# Patient Record
Sex: FEMALE | URBAN - METROPOLITAN AREA
[De-identification: names, ages, dates, MRNs, and addresses within clinical notes are randomized per-mention and may not be internally consistent; named-entity substitution may affect disease eponyms.]

---

## 2018-04-04 PROBLEM — R10.84 GENERALIZED ABDOMINAL PAIN: Status: ACTIVE | Noted: 2018-04-04

## 2018-04-04 PROBLEM — M41.9 SCOLIOSIS: Status: ACTIVE | Noted: 2018-04-04

## 2018-04-04 PROBLEM — F33.1 MODERATE EPISODE OF RECURRENT MAJOR DEPRESSIVE DISORDER (HCC): Status: ACTIVE | Noted: 2018-04-04

## 2018-04-04 PROBLEM — F41.1 GAD (GENERALIZED ANXIETY DISORDER): Status: ACTIVE | Noted: 2018-04-04

## 2018-04-04 PROBLEM — I10 ESSENTIAL HYPERTENSION: Status: ACTIVE | Noted: 2018-04-04

## 2018-04-04 PROBLEM — M51.36 DDD (DEGENERATIVE DISC DISEASE), LUMBAR: Status: ACTIVE | Noted: 2018-04-04

## 2018-04-04 PROBLEM — F33.41 RECURRENT MAJOR DEPRESSIVE DISORDER, IN PARTIAL REMISSION (HCC): Status: ACTIVE | Noted: 2018-04-04

## 2018-04-06 PROBLEM — J44.9 COPD (CHRONIC OBSTRUCTIVE PULMONARY DISEASE) (HCC): Status: ACTIVE | Noted: 2018-04-06

## 2018-04-06 PROBLEM — M72.0 DUPUYTREN'S CONTRACTURE OF LEFT HAND: Status: ACTIVE | Noted: 2018-04-06

## 2018-04-06 PROBLEM — R19.5 HEME POSITIVE STOOL: Status: ACTIVE | Noted: 2018-04-06

## 2018-06-20 ENCOUNTER — APPOINTMENT (RX ONLY)
Dept: URBAN - METROPOLITAN AREA CLINIC 349 | Facility: CLINIC | Age: 58
Setting detail: DERMATOLOGY
End: 2018-06-20

## 2018-06-20 DIAGNOSIS — D22 MELANOCYTIC NEVI: ICD-10-CM

## 2018-06-20 PROCEDURE — ? COUNSELING

## 2018-06-20 ASSESSMENT — LOCATION DETAILED DESCRIPTION DERM
LOCATION DETAILED: RIGHT INFERIOR MEDIAL UPPER BACK
LOCATION DETAILED: RIGHT PROXIMAL POSTERIOR THIGH
LOCATION DETAILED: RIGHT PROXIMAL DORSAL FOREARM
LOCATION DETAILED: LEFT PROXIMAL DORSAL FOREARM
LOCATION DETAILED: LEFT PROXIMAL POSTERIOR THIGH

## 2018-06-20 ASSESSMENT — LOCATION ZONE DERM
LOCATION ZONE: ARM
LOCATION ZONE: LEG
LOCATION ZONE: TRUNK

## 2018-06-20 ASSESSMENT — LOCATION SIMPLE DESCRIPTION DERM
LOCATION SIMPLE: RIGHT POSTERIOR THIGH
LOCATION SIMPLE: RIGHT FOREARM
LOCATION SIMPLE: LEFT POSTERIOR THIGH
LOCATION SIMPLE: LEFT FOREARM
LOCATION SIMPLE: RIGHT UPPER BACK

## 2018-10-02 ENCOUNTER — HOSPITAL ENCOUNTER (OUTPATIENT)
Dept: MRI IMAGING | Age: 58
Discharge: HOME | End: 2018-10-02
Payer: MEDICARE

## 2018-10-02 DIAGNOSIS — R26.89 BALANCE PROBLEMS: ICD-10-CM

## 2018-10-02 DIAGNOSIS — R42 DIZZY SPELLS: ICD-10-CM

## 2018-10-02 PROCEDURE — 70551 MRI BRAIN STEM W/O DYE: CPT

## 2018-12-02 PROBLEM — R79.89 ELEVATED LIVER FUNCTION TESTS: Status: ACTIVE | Noted: 2018-12-02

## 2018-12-02 PROBLEM — F51.04 PSYCHOPHYSIOLOGICAL INSOMNIA: Status: ACTIVE | Noted: 2018-12-02

## 2018-12-02 PROBLEM — R79.89 ABNORMAL CBC: Status: ACTIVE | Noted: 2018-12-02

## 2018-12-02 PROBLEM — R74.8 ELEVATED CPK: Status: ACTIVE | Noted: 2018-12-02

## 2018-12-02 PROBLEM — R79.89 ELEVATED PLATELET COUNT: Status: ACTIVE | Noted: 2018-12-02

## 2018-12-03 PROBLEM — Z79.899 ENCOUNTER FOR LONG-TERM (CURRENT) USE OF HIGH-RISK MEDICATION: Status: ACTIVE | Noted: 2018-12-03

## 2018-12-03 PROBLEM — Z79.899 LONG TERM PRESCRIPTION BENZODIAZEPINE USE: Status: ACTIVE | Noted: 2018-12-03

## 2022-03-18 PROBLEM — M41.9 SCOLIOSIS: Status: ACTIVE | Noted: 2018-04-04

## 2022-03-19 PROBLEM — F41.1 GAD (GENERALIZED ANXIETY DISORDER): Status: ACTIVE | Noted: 2018-04-04

## 2022-03-19 PROBLEM — R74.8 ELEVATED CPK: Status: ACTIVE | Noted: 2018-12-02

## 2022-03-19 PROBLEM — R79.89 ELEVATED PLATELET COUNT: Status: ACTIVE | Noted: 2018-12-02

## 2022-03-19 PROBLEM — F33.1 MODERATE EPISODE OF RECURRENT MAJOR DEPRESSIVE DISORDER (HCC): Status: ACTIVE | Noted: 2018-04-04

## 2022-03-19 PROBLEM — Z79.899 LONG TERM PRESCRIPTION BENZODIAZEPINE USE: Status: ACTIVE | Noted: 2018-12-03

## 2022-03-19 PROBLEM — Z79.899 ENCOUNTER FOR LONG-TERM (CURRENT) USE OF HIGH-RISK MEDICATION: Status: ACTIVE | Noted: 2018-12-03

## 2022-03-19 PROBLEM — M51.36 DDD (DEGENERATIVE DISC DISEASE), LUMBAR: Status: ACTIVE | Noted: 2018-04-04

## 2022-03-19 PROBLEM — R19.5 HEME POSITIVE STOOL: Status: ACTIVE | Noted: 2018-04-06

## 2022-03-19 PROBLEM — R79.89 ELEVATED LIVER FUNCTION TESTS: Status: ACTIVE | Noted: 2018-12-02

## 2022-03-19 PROBLEM — M51.369 DDD (DEGENERATIVE DISC DISEASE), LUMBAR: Status: ACTIVE | Noted: 2018-04-04

## 2022-03-19 PROBLEM — J44.9 COPD (CHRONIC OBSTRUCTIVE PULMONARY DISEASE) (HCC): Status: ACTIVE | Noted: 2018-04-06

## 2022-03-19 PROBLEM — R79.89 ABNORMAL CBC: Status: ACTIVE | Noted: 2018-12-02

## 2022-03-19 PROBLEM — I10 ESSENTIAL HYPERTENSION: Status: ACTIVE | Noted: 2018-04-04

## 2022-03-19 PROBLEM — M72.0 DUPUYTREN'S CONTRACTURE OF LEFT HAND: Status: ACTIVE | Noted: 2018-04-06

## 2022-03-20 PROBLEM — R10.84 GENERALIZED ABDOMINAL PAIN: Status: ACTIVE | Noted: 2018-04-04

## 2022-03-20 PROBLEM — F51.04 PSYCHOPHYSIOLOGICAL INSOMNIA: Status: ACTIVE | Noted: 2018-12-02

## 2023-03-26 ENCOUNTER — APPOINTMENT (OUTPATIENT)
Dept: GENERAL RADIOLOGY | Age: 63
DRG: 558 | End: 2023-03-26
Payer: MEDICARE

## 2023-03-26 ENCOUNTER — HOSPITAL ENCOUNTER (INPATIENT)
Age: 63
LOS: 2 days | Discharge: HOME | DRG: 558 | End: 2023-03-29
Attending: EMERGENCY MEDICINE | Admitting: FAMILY MEDICINE
Payer: MEDICARE

## 2023-03-26 ENCOUNTER — APPOINTMENT (OUTPATIENT)
Dept: CT IMAGING | Age: 63
DRG: 558 | End: 2023-03-26
Payer: MEDICARE

## 2023-03-26 DIAGNOSIS — R11.2 NAUSEA VOMITING AND DIARRHEA: ICD-10-CM

## 2023-03-26 DIAGNOSIS — R79.89 ELEVATED PROCALCITONIN: ICD-10-CM

## 2023-03-26 DIAGNOSIS — K52.9 COLITIS: ICD-10-CM

## 2023-03-26 DIAGNOSIS — E87.6 HYPOKALEMIA: Primary | ICD-10-CM

## 2023-03-26 DIAGNOSIS — R53.1 GENERALIZED WEAKNESS: ICD-10-CM

## 2023-03-26 DIAGNOSIS — R19.7 NAUSEA VOMITING AND DIARRHEA: ICD-10-CM

## 2023-03-26 DIAGNOSIS — E86.0 DEHYDRATION: ICD-10-CM

## 2023-03-26 DIAGNOSIS — R74.8 ELEVATED CPK: ICD-10-CM

## 2023-03-26 PROCEDURE — 87635 SARS-COV-2 COVID-19 AMP PRB: CPT

## 2023-03-26 PROCEDURE — 93005 ELECTROCARDIOGRAM TRACING: CPT | Performed by: EMERGENCY MEDICINE

## 2023-03-26 PROCEDURE — 96375 TX/PRO/DX INJ NEW DRUG ADDON: CPT

## 2023-03-26 PROCEDURE — 82077 ASSAY SPEC XCP UR&BREATH IA: CPT

## 2023-03-26 PROCEDURE — 80053 COMPREHEN METABOLIC PANEL: CPT

## 2023-03-26 PROCEDURE — 96361 HYDRATE IV INFUSION ADD-ON: CPT

## 2023-03-26 PROCEDURE — 84145 PROCALCITONIN (PCT): CPT

## 2023-03-26 PROCEDURE — 99285 EMERGENCY DEPT VISIT HI MDM: CPT

## 2023-03-26 PROCEDURE — 85025 COMPLETE CBC W/AUTO DIFF WBC: CPT

## 2023-03-26 PROCEDURE — 82550 ASSAY OF CK (CPK): CPT

## 2023-03-26 PROCEDURE — 87040 BLOOD CULTURE FOR BACTERIA: CPT

## 2023-03-26 PROCEDURE — 84484 ASSAY OF TROPONIN QUANT: CPT

## 2023-03-26 PROCEDURE — 71045 X-RAY EXAM CHEST 1 VIEW: CPT

## 2023-03-26 PROCEDURE — 83605 ASSAY OF LACTIC ACID: CPT

## 2023-03-26 PROCEDURE — 96374 THER/PROPH/DIAG INJ IV PUSH: CPT

## 2023-03-26 RX ORDER — ONDANSETRON 2 MG/ML
4 INJECTION INTRAMUSCULAR; INTRAVENOUS ONCE
Status: COMPLETED | OUTPATIENT
Start: 2023-03-26 | End: 2023-03-27

## 2023-03-26 RX ORDER — SODIUM CHLORIDE 9 MG/ML
30 INJECTION, SOLUTION INTRAVENOUS ONCE
Status: COMPLETED | OUTPATIENT
Start: 2023-03-26 | End: 2023-03-27

## 2023-03-26 ASSESSMENT — ENCOUNTER SYMPTOMS
COUGH: 0
SHORTNESS OF BREATH: 0
DIARRHEA: 1
NAUSEA: 1
BACK PAIN: 1
VOMITING: 1

## 2023-03-27 ENCOUNTER — APPOINTMENT (OUTPATIENT)
Dept: CT IMAGING | Age: 63
DRG: 558 | End: 2023-03-27
Payer: MEDICARE

## 2023-03-27 PROBLEM — J44.9 COPD (CHRONIC OBSTRUCTIVE PULMONARY DISEASE) (HCC): Status: ACTIVE | Noted: 2018-04-06

## 2023-03-27 PROBLEM — K52.9 COLITIS: Status: ACTIVE | Noted: 2023-03-27

## 2023-03-27 PROBLEM — M62.82 RHABDOMYOLYSIS: Status: ACTIVE | Noted: 2023-03-27

## 2023-03-27 PROBLEM — I10 ESSENTIAL HYPERTENSION: Status: ACTIVE | Noted: 2018-04-04

## 2023-03-27 LAB
ALBUMIN SERPL-MCNC: 3.6 G/DL (ref 3.2–4.6)
ALBUMIN/GLOB SERPL: 1.1 (ref 0.4–1.6)
ALP SERPL-CCNC: 85 U/L (ref 50–136)
ALT SERPL-CCNC: 17 U/L (ref 12–65)
AMPHET UR QL SCN: NEGATIVE
ANION GAP SERPL CALC-SCNC: 2 MMOL/L (ref 2–11)
ANION GAP SERPL CALC-SCNC: 7 MMOL/L (ref 2–11)
APPEARANCE UR: ABNORMAL
AST SERPL-CCNC: 50 U/L (ref 15–37)
BACTERIA URNS QL MICRO: ABNORMAL /HPF
BARBITURATES UR QL SCN: NEGATIVE
BASOPHILS # BLD: 0 K/UL (ref 0–0.2)
BASOPHILS # BLD: 0 K/UL (ref 0–0.2)
BASOPHILS NFR BLD: 0 % (ref 0–2)
BASOPHILS NFR BLD: 0 % (ref 0–2)
BENZODIAZ UR QL: POSITIVE
BILIRUB SERPL-MCNC: 0.6 MG/DL (ref 0.2–1.1)
BILIRUB UR QL: ABNORMAL
BUN SERPL-MCNC: 17 MG/DL (ref 8–23)
BUN SERPL-MCNC: 19 MG/DL (ref 8–23)
CALCIUM SERPL-MCNC: 8.2 MG/DL (ref 8.3–10.4)
CALCIUM SERPL-MCNC: 9.2 MG/DL (ref 8.3–10.4)
CANNABINOIDS UR QL SCN: POSITIVE
CASTS URNS QL MICRO: ABNORMAL /LPF
CHLORIDE SERPL-SCNC: 104 MMOL/L (ref 101–110)
CHLORIDE SERPL-SCNC: 98 MMOL/L (ref 101–110)
CK SERPL-CCNC: 1351 U/L (ref 21–215)
CK SERPL-CCNC: 2938 U/L (ref 21–215)
CO2 SERPL-SCNC: 26 MMOL/L (ref 21–32)
CO2 SERPL-SCNC: 27 MMOL/L (ref 21–32)
COCAINE UR QL SCN: NEGATIVE
COLOR UR: ABNORMAL
CREAT SERPL-MCNC: 0.7 MG/DL (ref 0.6–1)
CREAT SERPL-MCNC: 0.8 MG/DL (ref 0.6–1)
DIFFERENTIAL METHOD BLD: ABNORMAL
DIFFERENTIAL METHOD BLD: ABNORMAL
EKG ATRIAL RATE: 99 BPM
EKG DIAGNOSIS: NORMAL
EKG P AXIS: 73 DEGREES
EKG P-R INTERVAL: 47 MS
EKG Q-T INTERVAL: 367 MS
EKG QRS DURATION: 101 MS
EKG QTC CALCULATION (BAZETT): 471 MS
EKG R AXIS: 76 DEGREES
EKG T AXIS: -83 DEGREES
EKG VENTRICULAR RATE: 99 BPM
EOSINOPHIL # BLD: 0 K/UL (ref 0–0.8)
EOSINOPHIL # BLD: 0 K/UL (ref 0–0.8)
EOSINOPHIL NFR BLD: 0 % (ref 0.5–7.8)
EOSINOPHIL NFR BLD: 0 % (ref 0.5–7.8)
EPI CELLS #/AREA URNS HPF: ABNORMAL /HPF
ERYTHROCYTE [DISTWIDTH] IN BLOOD BY AUTOMATED COUNT: 13.6 % (ref 11.9–14.6)
ERYTHROCYTE [DISTWIDTH] IN BLOOD BY AUTOMATED COUNT: 13.6 % (ref 11.9–14.6)
ETHANOL SERPL-MCNC: <3 MG/DL (ref 0–0.08)
GLOBULIN SER CALC-MCNC: 3.4 G/DL (ref 2.8–4.5)
GLUCOSE BLD STRIP.AUTO-MCNC: 104 MG/DL (ref 65–100)
GLUCOSE SERPL-MCNC: 127 MG/DL (ref 65–100)
GLUCOSE SERPL-MCNC: 138 MG/DL (ref 65–100)
GLUCOSE UR STRIP.AUTO-MCNC: NEGATIVE MG/DL
HCT VFR BLD AUTO: 39.6 % (ref 35.8–46.3)
HCT VFR BLD AUTO: 44.2 % (ref 35.8–46.3)
HGB BLD-MCNC: 13.5 G/DL (ref 11.7–15.4)
HGB BLD-MCNC: 15.2 G/DL (ref 11.7–15.4)
HGB UR QL STRIP: ABNORMAL
IMM GRANULOCYTES # BLD AUTO: 0 K/UL (ref 0–0.5)
IMM GRANULOCYTES # BLD AUTO: 0.1 K/UL (ref 0–0.5)
IMM GRANULOCYTES NFR BLD AUTO: 0 % (ref 0–5)
IMM GRANULOCYTES NFR BLD AUTO: 1 % (ref 0–5)
KETONES UR QL STRIP.AUTO: ABNORMAL MG/DL
LACTATE SERPL-SCNC: 1.5 MMOL/L (ref 0.4–2)
LACTATE SERPL-SCNC: 1.9 MMOL/L (ref 0.4–2)
LEUKOCYTE ESTERASE UR QL STRIP.AUTO: ABNORMAL
LYMPHOCYTES # BLD: 0.7 K/UL (ref 0.5–4.6)
LYMPHOCYTES # BLD: 0.8 K/UL (ref 0.5–4.6)
LYMPHOCYTES NFR BLD: 10 % (ref 13–44)
LYMPHOCYTES NFR BLD: 7 % (ref 13–44)
MAGNESIUM SERPL-MCNC: 1 MG/DL (ref 1.8–2.4)
MCH RBC QN AUTO: 33.9 PG (ref 26.1–32.9)
MCH RBC QN AUTO: 34 PG (ref 26.1–32.9)
MCHC RBC AUTO-ENTMCNC: 34.1 G/DL (ref 31.4–35)
MCHC RBC AUTO-ENTMCNC: 34.4 G/DL (ref 31.4–35)
MCV RBC AUTO: 98.4 FL (ref 82–102)
MCV RBC AUTO: 99.7 FL (ref 82–102)
METHADONE UR QL: NEGATIVE
MONOCYTES # BLD: 0.5 K/UL (ref 0.1–1.3)
MONOCYTES # BLD: 0.8 K/UL (ref 0.1–1.3)
MONOCYTES NFR BLD: 7 % (ref 4–12)
MONOCYTES NFR BLD: 7 % (ref 4–12)
NEUTS SEG # BLD: 6.3 K/UL (ref 1.7–8.2)
NEUTS SEG # BLD: 9.4 K/UL (ref 1.7–8.2)
NEUTS SEG NFR BLD: 83 % (ref 43–78)
NEUTS SEG NFR BLD: 85 % (ref 43–78)
NITRITE UR QL STRIP.AUTO: NEGATIVE
NRBC # BLD: 0 K/UL (ref 0–0.2)
NRBC # BLD: 0 K/UL (ref 0–0.2)
OPIATES UR QL: NEGATIVE
OTHER OBSERVATIONS: ABNORMAL
PCP UR QL: NEGATIVE
PH UR STRIP: 6 (ref 5–9)
PLATELET # BLD AUTO: 180 K/UL (ref 150–450)
PLATELET # BLD AUTO: 221 K/UL (ref 150–450)
PMV BLD AUTO: 10 FL (ref 9.4–12.3)
PMV BLD AUTO: 9.7 FL (ref 9.4–12.3)
POTASSIUM SERPL-SCNC: 3.1 MMOL/L (ref 3.5–5.1)
POTASSIUM SERPL-SCNC: 3.4 MMOL/L (ref 3.5–5.1)
PROCALCITONIN SERPL-MCNC: 2.97 NG/ML (ref 0–0.49)
PROT SERPL-MCNC: 7 G/DL (ref 6.3–8.2)
PROT UR STRIP-MCNC: 100 MG/DL
RBC # BLD AUTO: 3.97 M/UL (ref 4.05–5.2)
RBC # BLD AUTO: 4.49 M/UL (ref 4.05–5.2)
RBC #/AREA URNS HPF: ABNORMAL /HPF
SARS-COV-2 RDRP RESP QL NAA+PROBE: NOT DETECTED
SERVICE CMNT-IMP: ABNORMAL
SODIUM SERPL-SCNC: 132 MMOL/L (ref 133–143)
SODIUM SERPL-SCNC: 132 MMOL/L (ref 133–143)
SOURCE: NORMAL
SP GR UR REFRACTOMETRY: 1.02 (ref 1–1.02)
TROPONIN I SERPL HS-MCNC: 9.5 PG/ML (ref 0–37)
TSH W FREE THYROID IF ABNORMAL: 0.97 UIU/ML (ref 0.36–3.74)
UROBILINOGEN UR QL STRIP.AUTO: 1 EU/DL (ref 0.2–1)
WBC # BLD AUTO: 11 K/UL (ref 4.3–11.1)
WBC # BLD AUTO: 7.6 K/UL (ref 4.3–11.1)
WBC URNS QL MICRO: ABNORMAL /HPF

## 2023-03-27 PROCEDURE — 84443 ASSAY THYROID STIM HORMONE: CPT

## 2023-03-27 PROCEDURE — 36415 COLL VENOUS BLD VENIPUNCTURE: CPT

## 2023-03-27 PROCEDURE — 70498 CT ANGIOGRAPHY NECK: CPT

## 2023-03-27 PROCEDURE — 6370000000 HC RX 637 (ALT 250 FOR IP): Performed by: FAMILY MEDICINE

## 2023-03-27 PROCEDURE — 82962 GLUCOSE BLOOD TEST: CPT

## 2023-03-27 PROCEDURE — 6360000002 HC RX W HCPCS: Performed by: EMERGENCY MEDICINE

## 2023-03-27 PROCEDURE — 83605 ASSAY OF LACTIC ACID: CPT

## 2023-03-27 PROCEDURE — 97530 THERAPEUTIC ACTIVITIES: CPT

## 2023-03-27 PROCEDURE — 2500000003 HC RX 250 WO HCPCS: Performed by: INTERNAL MEDICINE

## 2023-03-27 PROCEDURE — 74176 CT ABD & PELVIS W/O CONTRAST: CPT

## 2023-03-27 PROCEDURE — 6370000000 HC RX 637 (ALT 250 FOR IP): Performed by: INTERNAL MEDICINE

## 2023-03-27 PROCEDURE — 80307 DRUG TEST PRSMV CHEM ANLYZR: CPT

## 2023-03-27 PROCEDURE — 70450 CT HEAD/BRAIN W/O DYE: CPT

## 2023-03-27 PROCEDURE — 80048 BASIC METABOLIC PNL TOTAL CA: CPT

## 2023-03-27 PROCEDURE — 97161 PT EVAL LOW COMPLEX 20 MIN: CPT

## 2023-03-27 PROCEDURE — 82550 ASSAY OF CK (CPK): CPT

## 2023-03-27 PROCEDURE — 6360000002 HC RX W HCPCS: Performed by: FAMILY MEDICINE

## 2023-03-27 PROCEDURE — 83735 ASSAY OF MAGNESIUM: CPT

## 2023-03-27 PROCEDURE — 1100000000 HC RM PRIVATE

## 2023-03-27 PROCEDURE — 81001 URINALYSIS AUTO W/SCOPE: CPT

## 2023-03-27 PROCEDURE — 6360000002 HC RX W HCPCS: Performed by: INTERNAL MEDICINE

## 2023-03-27 PROCEDURE — 81003 URINALYSIS AUTO W/O SCOPE: CPT

## 2023-03-27 PROCEDURE — 85025 COMPLETE CBC W/AUTO DIFF WBC: CPT

## 2023-03-27 PROCEDURE — A4216 STERILE WATER/SALINE, 10 ML: HCPCS | Performed by: EMERGENCY MEDICINE

## 2023-03-27 PROCEDURE — 2580000003 HC RX 258: Performed by: FAMILY MEDICINE

## 2023-03-27 PROCEDURE — 2500000003 HC RX 250 WO HCPCS: Performed by: EMERGENCY MEDICINE

## 2023-03-27 PROCEDURE — 2580000003 HC RX 258: Performed by: EMERGENCY MEDICINE

## 2023-03-27 PROCEDURE — 6360000004 HC RX CONTRAST MEDICATION: Performed by: FAMILY MEDICINE

## 2023-03-27 RX ORDER — LORAZEPAM 1 MG/1
2 TABLET ORAL
Status: DISCONTINUED | OUTPATIENT
Start: 2023-03-27 | End: 2023-03-29 | Stop reason: HOSPADM

## 2023-03-27 RX ORDER — SERTRALINE HYDROCHLORIDE 100 MG/1
200 TABLET, FILM COATED ORAL DAILY
Status: DISCONTINUED | OUTPATIENT
Start: 2023-03-27 | End: 2023-03-29 | Stop reason: HOSPADM

## 2023-03-27 RX ORDER — SODIUM CHLORIDE 0.9 % (FLUSH) 0.9 %
5-40 SYRINGE (ML) INJECTION PRN
Status: DISCONTINUED | OUTPATIENT
Start: 2023-03-27 | End: 2023-03-29 | Stop reason: HOSPADM

## 2023-03-27 RX ORDER — SODIUM CHLORIDE 9 MG/ML
INJECTION, SOLUTION INTRAVENOUS PRN
Status: DISCONTINUED | OUTPATIENT
Start: 2023-03-27 | End: 2023-03-29 | Stop reason: HOSPADM

## 2023-03-27 RX ORDER — SODIUM CHLORIDE 0.9 % (FLUSH) 0.9 %
5-40 SYRINGE (ML) INJECTION EVERY 12 HOURS SCHEDULED
Status: DISCONTINUED | OUTPATIENT
Start: 2023-03-27 | End: 2023-03-29 | Stop reason: HOSPADM

## 2023-03-27 RX ORDER — TRAZODONE HYDROCHLORIDE 50 MG/1
150 TABLET ORAL NIGHTLY PRN
Status: DISCONTINUED | OUTPATIENT
Start: 2023-03-27 | End: 2023-03-29 | Stop reason: HOSPADM

## 2023-03-27 RX ORDER — POLYETHYLENE GLYCOL 3350 17 G/17G
17 POWDER, FOR SOLUTION ORAL DAILY PRN
Status: DISCONTINUED | OUTPATIENT
Start: 2023-03-27 | End: 2023-03-29 | Stop reason: HOSPADM

## 2023-03-27 RX ORDER — PANTOPRAZOLE SODIUM 40 MG/1
40 TABLET, DELAYED RELEASE ORAL
Status: DISCONTINUED | OUTPATIENT
Start: 2023-03-27 | End: 2023-03-29 | Stop reason: HOSPADM

## 2023-03-27 RX ORDER — ZOLPIDEM TARTRATE 5 MG/1
5 TABLET ORAL NIGHTLY PRN
Status: DISCONTINUED | OUTPATIENT
Start: 2023-03-27 | End: 2023-03-29 | Stop reason: HOSPADM

## 2023-03-27 RX ORDER — SODIUM CHLORIDE 0.9 % (FLUSH) 0.9 %
5-40 SYRINGE (ML) INJECTION EVERY 12 HOURS SCHEDULED
Status: DISCONTINUED | OUTPATIENT
Start: 2023-03-28 | End: 2023-03-29 | Stop reason: HOSPADM

## 2023-03-27 RX ORDER — LANOLIN ALCOHOL/MO/W.PET/CERES
100 CREAM (GRAM) TOPICAL DAILY
Status: DISCONTINUED | OUTPATIENT
Start: 2023-03-28 | End: 2023-03-29 | Stop reason: HOSPADM

## 2023-03-27 RX ORDER — GABAPENTIN 400 MG/1
400 CAPSULE ORAL 2 TIMES DAILY
Status: DISCONTINUED | OUTPATIENT
Start: 2023-03-27 | End: 2023-03-29 | Stop reason: HOSPADM

## 2023-03-27 RX ORDER — CLONAZEPAM 1 MG/1
1 TABLET ORAL 3 TIMES DAILY
Status: DISCONTINUED | OUTPATIENT
Start: 2023-03-27 | End: 2023-03-29 | Stop reason: HOSPADM

## 2023-03-27 RX ORDER — LORAZEPAM 1 MG/1
3 TABLET ORAL
Status: DISCONTINUED | OUTPATIENT
Start: 2023-03-27 | End: 2023-03-29 | Stop reason: HOSPADM

## 2023-03-27 RX ORDER — POTASSIUM CHLORIDE 7.45 MG/ML
10 INJECTION INTRAVENOUS
Status: ACTIVE | OUTPATIENT
Start: 2023-03-27 | End: 2023-03-27

## 2023-03-27 RX ORDER — POTASSIUM CHLORIDE 20 MEQ/1
40 TABLET, EXTENDED RELEASE ORAL PRN
Status: DISCONTINUED | OUTPATIENT
Start: 2023-03-27 | End: 2023-03-29 | Stop reason: HOSPADM

## 2023-03-27 RX ORDER — OXYMETAZOLINE HYDROCHLORIDE 0.05 G/100ML
2 SPRAY NASAL 2 TIMES DAILY
Status: DISCONTINUED | OUTPATIENT
Start: 2023-03-27 | End: 2023-03-29 | Stop reason: HOSPADM

## 2023-03-27 RX ORDER — ACETAMINOPHEN 325 MG/1
650 TABLET ORAL EVERY 6 HOURS PRN
Status: DISCONTINUED | OUTPATIENT
Start: 2023-03-27 | End: 2023-03-29 | Stop reason: HOSPADM

## 2023-03-27 RX ORDER — SODIUM CHLORIDE, SODIUM LACTATE, POTASSIUM CHLORIDE, CALCIUM CHLORIDE 600; 310; 30; 20 MG/100ML; MG/100ML; MG/100ML; MG/100ML
INJECTION, SOLUTION INTRAVENOUS CONTINUOUS
Status: DISCONTINUED | OUTPATIENT
Start: 2023-03-27 | End: 2023-03-29 | Stop reason: HOSPADM

## 2023-03-27 RX ORDER — ONDANSETRON 2 MG/ML
4 INJECTION INTRAMUSCULAR; INTRAVENOUS EVERY 6 HOURS PRN
Status: DISCONTINUED | OUTPATIENT
Start: 2023-03-27 | End: 2023-03-29 | Stop reason: HOSPADM

## 2023-03-27 RX ORDER — SUMATRIPTAN 50 MG/1
50 TABLET, FILM COATED ORAL ONCE
Status: COMPLETED | OUTPATIENT
Start: 2023-03-27 | End: 2023-03-27

## 2023-03-27 RX ORDER — LORAZEPAM 1 MG/1
1 TABLET ORAL
Status: DISCONTINUED | OUTPATIENT
Start: 2023-03-27 | End: 2023-03-29 | Stop reason: HOSPADM

## 2023-03-27 RX ORDER — METOPROLOL TARTRATE 50 MG/1
50 TABLET, FILM COATED ORAL 2 TIMES DAILY
Status: DISCONTINUED | OUTPATIENT
Start: 2023-03-27 | End: 2023-03-29 | Stop reason: HOSPADM

## 2023-03-27 RX ORDER — METRONIDAZOLE 500 MG/100ML
500 INJECTION, SOLUTION INTRAVENOUS ONCE
Status: COMPLETED | OUTPATIENT
Start: 2023-03-27 | End: 2023-03-27

## 2023-03-27 RX ORDER — 0.9 % SODIUM CHLORIDE 0.9 %
100 INTRAVENOUS SOLUTION INTRAVENOUS ONCE
Status: COMPLETED | OUTPATIENT
Start: 2023-03-27 | End: 2023-03-28

## 2023-03-27 RX ORDER — POTASSIUM CHLORIDE 7.45 MG/ML
10 INJECTION INTRAVENOUS PRN
Status: DISCONTINUED | OUTPATIENT
Start: 2023-03-27 | End: 2023-03-29 | Stop reason: HOSPADM

## 2023-03-27 RX ORDER — LORAZEPAM 2 MG/ML
1 INJECTION INTRAMUSCULAR ONCE
Status: COMPLETED | OUTPATIENT
Start: 2023-03-27 | End: 2023-03-27

## 2023-03-27 RX ORDER — LORAZEPAM 2 MG/ML
1 INJECTION INTRAMUSCULAR
Status: DISCONTINUED | OUTPATIENT
Start: 2023-03-27 | End: 2023-03-29 | Stop reason: HOSPADM

## 2023-03-27 RX ORDER — NICOTINE 21 MG/24HR
1 PATCH, TRANSDERMAL 24 HOURS TRANSDERMAL DAILY
Status: DISCONTINUED | OUTPATIENT
Start: 2023-03-27 | End: 2023-03-29 | Stop reason: HOSPADM

## 2023-03-27 RX ORDER — LANOLIN ALCOHOL/MO/W.PET/CERES
500 CREAM (GRAM) TOPICAL DAILY
Status: DISCONTINUED | OUTPATIENT
Start: 2023-03-27 | End: 2023-03-29 | Stop reason: HOSPADM

## 2023-03-27 RX ORDER — ONDANSETRON 4 MG/1
4 TABLET, ORALLY DISINTEGRATING ORAL EVERY 8 HOURS PRN
Status: DISCONTINUED | OUTPATIENT
Start: 2023-03-27 | End: 2023-03-29 | Stop reason: HOSPADM

## 2023-03-27 RX ORDER — MAGNESIUM SULFATE IN WATER 40 MG/ML
2000 INJECTION, SOLUTION INTRAVENOUS PRN
Status: DISCONTINUED | OUTPATIENT
Start: 2023-03-27 | End: 2023-03-29 | Stop reason: HOSPADM

## 2023-03-27 RX ORDER — ENOXAPARIN SODIUM 100 MG/ML
40 INJECTION SUBCUTANEOUS
Status: DISCONTINUED | OUTPATIENT
Start: 2023-03-27 | End: 2023-03-29 | Stop reason: HOSPADM

## 2023-03-27 RX ORDER — METRONIDAZOLE 500 MG/1
500 TABLET ORAL EVERY 8 HOURS SCHEDULED
Status: DISCONTINUED | OUTPATIENT
Start: 2023-03-27 | End: 2023-03-29 | Stop reason: HOSPADM

## 2023-03-27 RX ORDER — LORAZEPAM 2 MG/ML
2 INJECTION INTRAMUSCULAR
Status: DISCONTINUED | OUTPATIENT
Start: 2023-03-27 | End: 2023-03-29 | Stop reason: HOSPADM

## 2023-03-27 RX ORDER — TRAMADOL HYDROCHLORIDE 50 MG/1
50 TABLET ORAL EVERY 6 HOURS PRN
Status: DISCONTINUED | OUTPATIENT
Start: 2023-03-27 | End: 2023-03-29 | Stop reason: HOSPADM

## 2023-03-27 RX ORDER — LORAZEPAM 2 MG/ML
3 INJECTION INTRAMUSCULAR
Status: DISCONTINUED | OUTPATIENT
Start: 2023-03-27 | End: 2023-03-29 | Stop reason: HOSPADM

## 2023-03-27 RX ORDER — LORAZEPAM 1 MG/1
4 TABLET ORAL
Status: DISCONTINUED | OUTPATIENT
Start: 2023-03-27 | End: 2023-03-29 | Stop reason: HOSPADM

## 2023-03-27 RX ORDER — LANOLIN ALCOHOL/MO/W.PET/CERES
3 CREAM (GRAM) TOPICAL ONCE
Status: COMPLETED | OUTPATIENT
Start: 2023-03-27 | End: 2023-03-27

## 2023-03-27 RX ORDER — CIPROFLOXACIN 2 MG/ML
400 INJECTION, SOLUTION INTRAVENOUS EVERY 12 HOURS
Status: DISCONTINUED | OUTPATIENT
Start: 2023-03-27 | End: 2023-03-29 | Stop reason: HOSPADM

## 2023-03-27 RX ORDER — LORAZEPAM 2 MG/ML
4 INJECTION INTRAMUSCULAR
Status: DISCONTINUED | OUTPATIENT
Start: 2023-03-27 | End: 2023-03-29 | Stop reason: HOSPADM

## 2023-03-27 RX ADMIN — CIPROFLOXACIN 400 MG: 2 INJECTION, SOLUTION INTRAVENOUS at 18:17

## 2023-03-27 RX ADMIN — MAGNESIUM SULFATE HEPTAHYDRATE 2000 MG: 40 INJECTION, SOLUTION INTRAVENOUS at 10:50

## 2023-03-27 RX ADMIN — ONDANSETRON 4 MG: 2 INJECTION INTRAMUSCULAR; INTRAVENOUS at 09:30

## 2023-03-27 RX ADMIN — METOPROLOL TARTRATE 50 MG: 50 TABLET ORAL at 08:31

## 2023-03-27 RX ADMIN — SODIUM CHLORIDE, PRESERVATIVE FREE 10 ML: 5 INJECTION INTRAVENOUS at 20:51

## 2023-03-27 RX ADMIN — POTASSIUM CHLORIDE 10 MEQ: 7.46 INJECTION, SOLUTION INTRAVENOUS at 01:27

## 2023-03-27 RX ADMIN — METRONIDAZOLE 500 MG: 500 TABLET ORAL at 14:36

## 2023-03-27 RX ADMIN — MAGNESIUM SULFATE HEPTAHYDRATE 2000 MG: 40 INJECTION, SOLUTION INTRAVENOUS at 08:33

## 2023-03-27 RX ADMIN — OXYMETAZOLINE HCL 2 SPRAY: 0.05 SPRAY NASAL at 14:36

## 2023-03-27 RX ADMIN — SUMATRIPTAN SUCCINATE 50 MG: 50 TABLET ORAL at 23:48

## 2023-03-27 RX ADMIN — SODIUM CHLORIDE, PRESERVATIVE FREE 10 ML: 5 INJECTION INTRAVENOUS at 23:15

## 2023-03-27 RX ADMIN — CYANOCOBALAMIN TAB 1000 MCG 500 MCG: 1000 TAB at 08:32

## 2023-03-27 RX ADMIN — IOPAMIDOL 100 ML: 755 INJECTION, SOLUTION INTRAVENOUS at 23:13

## 2023-03-27 RX ADMIN — TUBERCULIN PURIFIED PROTEIN DERIVATIVE 5 UNITS: 5 INJECTION, SOLUTION INTRADERMAL at 08:41

## 2023-03-27 RX ADMIN — CIPROFLOXACIN 400 MG: 2 INJECTION, SOLUTION INTRAVENOUS at 06:24

## 2023-03-27 RX ADMIN — PANTOPRAZOLE SODIUM 40 MG: 40 TABLET, DELAYED RELEASE ORAL at 06:23

## 2023-03-27 RX ADMIN — LORAZEPAM 1 MG: 2 INJECTION INTRAMUSCULAR; INTRAVENOUS at 22:48

## 2023-03-27 RX ADMIN — METOPROLOL TARTRATE 50 MG: 50 TABLET ORAL at 20:56

## 2023-03-27 RX ADMIN — POTASSIUM BICARBONATE 40 MEQ: 782 TABLET, EFFERVESCENT ORAL at 06:23

## 2023-03-27 RX ADMIN — METRONIDAZOLE 500 MG: 500 TABLET ORAL at 06:23

## 2023-03-27 RX ADMIN — FAMOTIDINE 20 MG: 10 INJECTION INTRAVENOUS at 00:22

## 2023-03-27 RX ADMIN — TRAMADOL HYDROCHLORIDE 50 MG: 50 TABLET ORAL at 03:01

## 2023-03-27 RX ADMIN — Medication 3 MG: at 20:56

## 2023-03-27 RX ADMIN — CHOLECALCIFEROL TAB 125 MCG (5000 UNIT) 5000 UNITS: 125 TAB at 08:31

## 2023-03-27 RX ADMIN — ONDANSETRON 4 MG: 2 INJECTION INTRAMUSCULAR; INTRAVENOUS at 00:21

## 2023-03-27 RX ADMIN — SODIUM CHLORIDE, PRESERVATIVE FREE 10 ML: 5 INJECTION INTRAVENOUS at 08:32

## 2023-03-27 RX ADMIN — ENOXAPARIN SODIUM 40 MG: 40 INJECTION SUBCUTANEOUS at 23:48

## 2023-03-27 RX ADMIN — METRONIDAZOLE 500 MG: 500 INJECTION, SOLUTION INTRAVENOUS at 01:29

## 2023-03-27 RX ADMIN — METRONIDAZOLE 500 MG: 500 TABLET ORAL at 23:48

## 2023-03-27 RX ADMIN — SODIUM CHLORIDE 100 ML: 9 INJECTION, SOLUTION INTRAVENOUS at 23:15

## 2023-03-27 RX ADMIN — CEFTRIAXONE 1000 MG: 1 INJECTION, POWDER, FOR SOLUTION INTRAMUSCULAR; INTRAVENOUS at 01:12

## 2023-03-27 RX ADMIN — SERTRALINE 200 MG: 100 TABLET, FILM COATED ORAL at 08:31

## 2023-03-27 RX ADMIN — SODIUM CHLORIDE 30 ML/KG/HR: 9 INJECTION, SOLUTION INTRAVENOUS at 00:22

## 2023-03-27 RX ADMIN — ACETAMINOPHEN 650 MG: 325 TABLET ORAL at 14:36

## 2023-03-27 RX ADMIN — SODIUM CHLORIDE, POTASSIUM CHLORIDE, SODIUM LACTATE AND CALCIUM CHLORIDE: 600; 310; 30; 20 INJECTION, SOLUTION INTRAVENOUS at 03:00

## 2023-03-27 RX ADMIN — OXYMETAZOLINE HCL 2 SPRAY: 0.05 SPRAY NASAL at 20:55

## 2023-03-27 ASSESSMENT — PAIN - FUNCTIONAL ASSESSMENT: PAIN_FUNCTIONAL_ASSESSMENT: PREVENTS OR INTERFERES SOME ACTIVE ACTIVITIES AND ADLS

## 2023-03-27 ASSESSMENT — PAIN SCALES - GENERAL
PAINLEVEL_OUTOF10: 0
PAINLEVEL_OUTOF10: 6
PAINLEVEL_OUTOF10: 10
PAINLEVEL_OUTOF10: 0
PAINLEVEL_OUTOF10: 10

## 2023-03-27 ASSESSMENT — PAIN DESCRIPTION - ORIENTATION
ORIENTATION: MID;LOWER
ORIENTATION: MID;LOWER

## 2023-03-27 ASSESSMENT — PAIN DESCRIPTION - DESCRIPTORS
DESCRIPTORS: ACHING
DESCRIPTORS: ACHING;DISCOMFORT

## 2023-03-27 ASSESSMENT — PAIN DESCRIPTION - LOCATION
LOCATION: HEAD;NECK
LOCATION: ABDOMEN;NECK;BACK
LOCATION: BACK;NECK;LEG

## 2023-03-27 NOTE — PROGRESS NOTES
Potassium is 3.4. Pt not tolerating last bag of IV Potassium. Will give PO Potassium with morning meds.

## 2023-03-27 NOTE — ED NOTES
TRANSFER - OUT REPORT:    Verbal report given to Yen Jean RN on Natty Juarez  being transferred to  for routine progression of patient care       Report consisted of patient's Situation, Background, Assessment and   Recommendations(SBAR). Information from the following report(s) Nurse Handoff Report, Intake/Output, MAR, and Recent Results was reviewed with the receiving nurse. [place] Assessment: No data recorded  Lines:   Peripheral IV 03/27/23 Right Antecubital (Active)        Opportunity for questions and clarification was provided.       Patient transported with:  Qi Claire RN  03/27/23 5102

## 2023-03-27 NOTE — ASSESSMENT & PLAN NOTE
- CK is 1,357  - Renal function okay at this time, continue to monitor  - IVFs  - Monitor daily CK levels

## 2023-03-27 NOTE — PROGRESS NOTES
Exercise/HEP  Gait Training  Modalities  Education       TREATMENT:   EVALUATION: LOW COMPLEXITY: (Untimed Charge)    TREATMENT:   Therapeutic Activity (23 Minutes): Therapeutic activity included Scooting, Transfer Training, Ambulation on level ground, Sitting balance , and Standing balance to improve functional Activity tolerance, Balance, Mobility, and Strength.     TREATMENT GRID:  N/A    AFTER TREATMENT PRECAUTIONS: Bed/Chair Locked, Call light within reach, Chair, Needs within reach, and RN notified    INTERDISCIPLINARY COLLABORATION:  RN/ PCT and PT/ PTA    EDUCATION: Education Given To: Patient  Education Provided: Role of Therapy    TIME IN/OUT:  Time In: 1500  Time Out: [address]  Minutes: [address] [address], PT

## 2023-03-27 NOTE — PLAN OF CARE
Problem: Discharge Planning  Goal: Discharge to home or other facility with appropriate resources  Outcome: Progressing  Flowsheets (Taken 3/27/2023 9439)  Discharge to home or other facility with appropriate resources:   Identify barriers to discharge with patient and caregiver   Arrange for needed discharge resources and transportation as appropriate   Identify discharge learning needs (meds, wound care, etc)

## 2023-03-27 NOTE — ASSESSMENT & PLAN NOTE
- Mild colitis see on CT  - Procal is elevated and WBC is 11  - IV cipro/flagyl  - Blood cultures obtained in ER

## 2023-03-27 NOTE — PROGRESS NOTES
Comprehensive Nutrition Assessment    Type and Reason for Visit: Initial, Positive Nutrition Screen  Malnutrition Screening Tool: Malnutrition Screen  Have you recently lost weight without trying?: 2 to 13 pounds (1 point)  Have you been eating poorly because of a decreased appetite?: Yes (1 point)  Malnutrition Screening Tool Score: 2    This assessment was completed remotely. Unable to reach pt or family member via room phone x 2 attempts.    Nutrition Recommendations/Plan:   Meals and Snacks:  Diet: Add fiber restriction, remove once GI sx improved  Nutrition Supplement Therapy:  Medical food supplement therapy:  Continue Ensure Enlive TID (this provides 350 kcal and 20 grams protein per bottle) per [facility] protocol  Educate pt on diet to follow related to colitis as able     Malnutrition Assessment:  Malnutrition Status: Insufficient data (limited history. RD to f/u.)    On-site RD to f/u for NFPE  Nutrition Assessment:  Nutrition History: MST 2. Pt reports mild weight loss with decreased appetite. Weight stable per EMR history however is limited. Unable to gain further history at this time.     Do You Have Any Cultural, Synagogue, or Ethnic Food Preferences?: No   Nutrition Background:   Wound Type: None   62 y.o female admitted w/ rhabdomyolysis and colitis. C diff pending.    PMH: JAYLEN, HTN, IBS, COPD, polysubstance abuse  + diarrhea PTA, N/V  IO incomplete due to recent admit  Labs reviewed - low Mg and K +  MAR: abx, zofran, pantoprazole, B12, D3, LR at 100 ml/hr    Weight history:  3/27/23 56.8 kg  1/5/22 56.5 kg   Nutrition Interval:  ONS ordered per [facility] protocol. New admit so limited meal documentation, but had <25% of meals this AM. Will adjust to low fiber diet while with colitis and diarrhea. Asked RN to collect new weight, as admission weight source not known. RD to f/u.     Current Nutrition Therapies:  ADULT DIET; Regular  ADULT ORAL NUTRITION SUPPLEMENT; Breakfast, Lunch, Dinner; Standard High  Calorie/High Protein Oral Supplement    Current Intake:   Average Meal Intake: 1-25% Average Supplements Intake: Unable to assess      Anthropometric Measures:  Height: 5' 4.5\" (163.8 cm)  Current Body Wt: 125 lb 3.5 oz (56.8 kg), Weight source: Not Specified  BMI: 21.2, Normal Weight (BMI 18.5-24. 9)  Admission Body Weight: 125 lb 3.5 oz (56.8 kg)  Ideal Body Weight (Kg) (Calculated): 56 kg (123 lbs), 101.8 %    Edema:Peripheral Vascular  Peripheral Vascular (WDL): Within Defined Limits  Edema: None   BMI Category Normal Weight (BMI 18.5-24. 9)  Estimated Daily Nutrient Needs:  Energy (kcal/day): (25-30 kcal/kg) 6380-1027 (Kcal/kg Weight used: 56.8 kg Current  Protein (g/day): (1-1.2 g/kg) 57-68 Weight Used: (Current) 56.8 kg  Fluid (ml/day):   ( (1.5-2 L or per MD))    Nutrition Diagnosis:   Inadequate protein-energy intake related to altered GI function as evidenced by diarrhea, nausea, vomiting    Nutrition Interventions:   Food and/or Nutrient Delivery: Modify Current Diet, Continue Oral Nutrition Supplement  Nutrition Education/Counseling: No recommendation at this time  Coordination of Nutrition Care: Continue to monitor while inpatient       Goals: Active Goal: PO intake 50% or greater       Nutrition Monitoring and Evaluation:   Behavioral-Environmental Outcomes: None Identified  Food/Nutrient Intake Outcomes: Food and Nutrient Intake  Physical Signs/Symptoms Outcomes: Diarrhea, Nausea or Vomiting, GI Status    Discharge Planning:     Too soon to determine    Ziggy Delatorre RD

## 2023-03-27 NOTE — ED TRIAGE NOTES
Pt arrives via [facility] coming from home c/o NVD for 3 days. Pt states \"I waited to come in because I dont like hospitals\". Hx CVA, COPD, HTN. Per EMS, pt had possible positive orthostatics. No other complaints at time of triage.      EMS transmitted a 12 lead read by Dr. Fabiano Hou

## 2023-03-27 NOTE — PROGRESS NOTES
TRANSFER - IN REPORT:    Verbal report received from Chetan Blandon RN on Curly Santiago  being received from ED for routine progression of patient care      Report consisted of patient's Situation, Background, Assessment and   Recommendations(SBAR). Information from the following report(s) ED SBAR was reviewed with the receiving nurse. Opportunity for questions and clarification was provided. Assessment completed upon patient's arrival to unit and care assumed.   in

## 2023-03-27 NOTE — PROGRESS NOTES
AM    KETUA TRACE 03/27/2023 12:00 AM    UROBILINOGEN 1.0 03/27/2023 12:00 AM    BILIRUBINUR SMALL 03/27/2023 12:00 AM

## 2023-03-27 NOTE — PROGRESS NOTES
Pt reports 2 loose bowel movements in the night and one just now. Pt recently on antibiotics and is on antibiotics again. [address] [facility] [address] Keiko Estrella and orders received for C Diff rule out. Isolation set up.

## 2023-03-27 NOTE — PROGRESS NOTES
Hospitalist Progress Note   Admit Date:  3/26/2023 11:30 PM   Name:  Mary Muir   Age:  57 y.o. Sex:  female  :  1960   MRN:  [id number]   Room:  [facility]    Presenting/Chief Complaint: Emesis     Reason(s) for Admission: Rhabdomyolysis [M62.82]  Dehydration [E86.0]  Hypokalemia [E87.6]  Colitis [K52.9]  Generalized weakness [R53.1]  Elevated CPK [R74.8]  Nausea vomiting and diarrhea [R11.2, R19.7]  Elevated procalcitonin [R79.89]     Hospital Course:   Mary Muir is a 57 y.o. female with medical history of pretension, COPD, history of alcohol and drug use who presents to the emergency room after being found down at home after welfare check called in by her daughter. Laboratory work-up with rhabdomyolysis CK of 1357, procalcitonin of 2.97, WBC of 11.0. CT abdomen pelvis with mild colitis. UDS positive for benzodiazepine and THC. Subjective & 24hr Events (23): Seen and examined at bedside. No acute events noted overnight. Patient reports nausea and is noted to be coughing/spitting out her encounter. Reports that she has postnasal drip and has been getting worse over the past several days. Denies any drug use. Reports that she did not fall down or was on the ground for long. Reports having multiple episodes of watery green color stool for past several days. Denies any recent antibiotics use. Assessment & Plan:   Colitis  CT images reviewed. -continue with ciprofloxacin and Flagyl.  -Continue IV fluids  -On regular diet. Watery Diarrhea  - check Cdiff    Rhabdomyolysis  CK of 1351 on admission and increased to 2938 this morning.  -Continue IV fluids. Renal function stable at this time. Essential hypertension: on metoprolol    COPD (chronic obstructive pulmonary disease)  - stable. No wheezing on exam     Thyroid Nodule  - Follows with Dr.Wendy Sims.  [facility]) and was noted to be benign  - check TSH      Diet:  ADULT DIET; PRN    tuberculin injection 5 Units  5 Units IntraDERmal Once    oxymetazoline (AFRIN) 0.05 % nasal spray 2 spray  2 spray Each Nostril BID       Signed:  Jacek Flanagan MD    Part of this note may have been written by using a voice dictation software. The note has been proof read but may still contain some grammatical/other typographical errors.

## 2023-03-27 NOTE — H&P
Hospitalist History and Physical   Admit Date:  3/26/2023 11:30 PM   Name:  Ana Anderson   Age:  57 y.o. Sex:  female  :  1960   MRN:  [id number]     Presenting Complaint: N/V/D, weakness  Reason(s) for Admission: Rhabdomyolysis [M62.82]  Dehydration [E86.0]  Hypokalemia [E87.6]  Colitis [K52.9]  Generalized weakness [R53.1]  Elevated CPK [R74.8]  Nausea vomiting and diarrhea [R11.2, R19.7]  Elevated procalcitonin [R79.89]     History of Present Illness:   Ana Anderson is a 57 y.o. female with medical history of HTN, COPD who presented to ED via EMS after being found down at home after a welfare check. Per EMS, daughter called the [employer] for a welfare check on the patient. The patient has a h/o drug and alcohol use per daughter. Patient was found on the floor in her home. She reports that she laid down purposely on the floor, but then could not get back up. She reports that she does not think she was on the floor for long. Upon ER evaluation, CK Is 1,357. Renal function currently okay. Procalcitonin is 2.97. WBC is 11.0.  CT A/P shows \"mild colitis. \"  UDS is positive for benzodiazepines and THC. Hospitalist asked to admit. Review of Systems:  10 systems reviewed and negative except as noted in HPI. Assessment & Plan:   Colitis  Assessment & Plan  - Mild colitis see on CT  - Procal is elevated and WBC is 11  - IV cipro/flagyl  - Blood cultures obtained in ER    * Rhabdomyolysis  Assessment & Plan  - CK is 1,357  - Renal function okay at this time, continue to monitor  - IVFs  - Monitor daily CK levels    COPD (chronic obstructive pulmonary disease) (HCC)  Assessment & Plan  - Per chart review    Essential hypertension  Assessment & Plan  - BP stable  - Home meds      Disposition: inpatient      Past medical history reviewed.     Past Medical History:   Diagnosis Date    Anxiety and depression     Essential hypertension     Irritable bowel     Osteoarthritis Immature Granulocytes 1 0.0 - 5.0 %    Segs Absolute 9.4 (H) 1.7 - 8.2 K/UL    Absolute Lymph # 0.8 0.5 - 4.6 K/UL    Absolute Mono # 0.8 0.1 - 1.3 K/UL    Absolute Eos # 0.0 0.0 - 0.8 K/UL    Basophils Absolute 0.0 0.0 - 0.2 K/UL    Absolute Immature Granulocyte 0.1 0.0 - 0.5 K/UL   CMP    Collection Time: 03/26/23 11:36 PM   Result Value Ref Range    Sodium 132 (L) 133 - 143 mmol/L    Potassium 3.1 (L) 3.5 - 5.1 mmol/L    Chloride 98 (L) 101 - 110 mmol/L    CO2 27 21 - 32 mmol/L    Anion Gap 7 2 - 11 mmol/L    Glucose 138 (H) 65 - 100 mg/dL    BUN 19 8 - 23 MG/DL    Creatinine 0.80 0.6 - 1.0 MG/DL    Est, Glom Filt Rate >60 >60 ml/min/1.73m2    Calcium 9.2 8.3 - 10.4 MG/DL    Total Bilirubin 0.6 0.2 - 1.1 MG/DL    ALT 17 12 - 65 U/L    AST 50 (H) 15 - 37 U/L    Alk Phosphatase 85 50 - 136 U/L    Total Protein 7.0 6.3 - 8.2 g/dL    Albumin 3.6 3.2 - 4.6 g/dL    Globulin 3.4 2.8 - 4.5 g/dL    Albumin/Globulin Ratio 1.1 0.4 - 1.6     Procalcitonin    Collection Time: 03/26/23 11:36 PM   Result Value Ref Range    Procalcitonin 2.97 (H) 0.00 - 0.49 ng/mL   Troponin    Collection Time: 03/26/23 11:36 PM   Result Value Ref Range    Troponin, High Sensitivity 9.5 0 - 37 pg/mL   Alcohol    Collection Time: 03/26/23 11:36 PM   Result Value Ref Range    Ethanol Lvl <3 MG/DL   CK    Collection Time: 03/26/23 11:36 PM   Result Value Ref Range    Total CK 1,351 (H) 21 - 215 U/L   Lactate, Sepsis    Collection Time: 03/26/23 11:54 PM   Result Value Ref Range    Lactic Acid, Sepsis 1.9 0.4 - 2.0 MMOL/L   COVID-19, Rapid    Collection Time: 03/26/23 11:59 PM    Specimen: Nasopharyngeal   Result Value Ref Range    Source NASAL      SARS-CoV-2, Rapid Not detected NOTD     Urine Drug Screen    Collection Time: 03/27/23 12:00 AM   Result Value Ref Range    PCP, Urine Negative NEG      Benzodiazepines, Urine Positive (A) NEG      Cocaine, Urine Negative NEG      Amphetamine, Urine Negative NEG      Methadone, Urine Negative NEG

## 2023-03-27 NOTE — FLOWSHEET NOTE
03/27/23 0200   Dual Clinician Skin Assessment   Dual Skin Assessment (4 Eyes) WDL   Second Clinical  (First and Last Name) Christine Slade RN     Dual skin assessment completed with Adrianne Tucker RN. Pt's skin is warm, dry, and fragile. Pt has scattered bruising. Pt has a scab on her toe on the right foot. Heels and sacrum are intact. No pressure injuries noted at this time.

## 2023-03-27 NOTE — PROGRESS NOTES
Daughter,Stephania, called for an update. Security code given with permission of the patient. All questions/concerns addressed.

## 2023-03-27 NOTE — ED PROVIDER NOTES
Emergency Department Provider Note                   PCP:                JEFF Garcia NP               Age: 57 y.o. Sex: female       ICD-10-CM    1. Hypokalemia  E87.6       2. Nausea vomiting and diarrhea  R11.2     R19.7       3. Elevated CPK  R74.8       4. Elevated procalcitonin  R79.89       5. Dehydration  E86.0       6. Generalized weakness  R53.1       7. Colitis  K52.9           DISPOSITION         MDM  Number of Diagnoses or Management Options  Colitis: new, needed workup  Dehydration: new, needed workup  Elevated CPK: new, needed workup  Elevated procalcitonin: new, needed workup  Generalized weakness: new, needed workup  Hypokalemia: new, needed workup  Nausea vomiting and diarrhea: new, needed workup  Diagnosis management comments: MEDICAL DECISION MAKING  Complexity of Problems Addressed:  1 or more acute illnesses that pose a threat to life or bodily function. Data Reviewed and Analyzed:  Category 1:   I ordered each unique test.  I interpreted the results of each unique test.    The patients assessment required an independent historian: Some history obtained from EMS as noted in HPI and MDM. Category 2:   I independently ordered and interpreted the X-rays. NO acute infiltrate. Category 3: Discussion of management or test interpretation. Patient presents with N/V/D and then general weakness tonight. Slightly tachycardic on arrival. IV fluids 30 ml/kg ordered. IV Pepcid and Zofran given for N/V. Potassium is slightly low, IV replacement ordered due to patient's vomiting. Patient does have elevated CPK, addressed with IV fluids. Her PCT is elevated which is concerning for infection. UA is abnormal, c/w dehydration but only small LE, IV Rocephin given due to elevated PCT. Drug screen is positive for benzodiazepines and THC. CXR shows no acute findings. CT abdomen was c/w with colitis, IV Flagyl added. Consulted internal medicine for admission.      Risk of Complications and/or Morbidity of Patient Management:  Patient was admitted I have communication with admitting physician     Patient Progress  Patient progress: improved             Orders Placed This Encounter   Procedures    Culture, Blood 1    Culture, Blood 2    COVID-19, Rapid    XR CHEST PORTABLE    CT ABDOMEN PELVIS WO CONTRAST Additional Contrast? None    Lactate, Sepsis    CBC with Auto Differential    CMP    Procalcitonin    Troponin    Urine Drug Screen    Alcohol    CK    Urinalysis w rflx microscopic    Cardiac Monitor - ED Only    POCT Urine Dipstick    POCT Urinalysis no Micro    EKG 12 Lead    Saline lock IV        Medications   famotidine (PEPCID) 20 mg in sodium chloride (PF) 0.9 % 10 mL injection (20 mg IntraVENous Given 3/27/23 0022)   cefTRIAXone (ROCEPHIN) 1,000 mg in sodium chloride 0.9 % 50 mL IVPB (mini-bag) (1,000 mg IntraVENous New Bag 3/27/23 0112)   metronidazole (FLAGYL) 500 mg in 0.9% NaCl 100 mL IVPB premix (has no administration in time range)   0.9 % sodium chloride infusion (30 mL/kg/hr × 58.1 kg IntraVENous New Bag 3/27/23 0022)   ondansetron (ZOFRAN) injection 4 mg (4 mg IntraVENous Given 3/27/23 0021)       New Prescriptions    No medications on file        Corey Luis is a 57 y.o. female who presents to the Emergency Department with chief complaint of    Chief Complaint   Patient presents with    Emesis      Patient presents from home by EMS. She reports nausea, vomiting, and diarrhea for the past few days. Patient states tonight she felt hot and laid down on the floor. She was then unable to get back up. EMS report that the patient's daughter had called the 's department to check on the patient as she had not heard from her. She reported to them the patient had a history of alcohol and drug abuse. The patient states that she is having pain in her neck, hip, and her back and some generalized abdominal cramping. She states her stool has been green.  She reports that she had felt

## 2023-03-28 LAB
ANION GAP SERPL CALC-SCNC: 4 MMOL/L (ref 2–11)
BASOPHILS # BLD: 0 K/UL (ref 0–0.2)
BASOPHILS NFR BLD: 0 % (ref 0–2)
BILIRUB UR QL: ABNORMAL
BUN SERPL-MCNC: 9 MG/DL (ref 8–23)
C DIFF GDH STL QL: NORMAL
C DIFF TOX A+B STL QL IA: NORMAL
C DIFF TOXIN INTERPRETATION: NORMAL
CALCIUM SERPL-MCNC: 8.5 MG/DL (ref 8.3–10.4)
CHLORIDE SERPL-SCNC: 106 MMOL/L (ref 101–110)
CK SERPL-CCNC: 1788 U/L (ref 21–215)
CLINICAL CONSIDERATION: NORMAL
CO2 SERPL-SCNC: 25 MMOL/L (ref 21–32)
CREAT SERPL-MCNC: 0.5 MG/DL (ref 0.6–1)
DIFFERENTIAL METHOD BLD: ABNORMAL
EOSINOPHIL # BLD: 0 K/UL (ref 0–0.8)
EOSINOPHIL NFR BLD: 0 % (ref 0.5–7.8)
ERYTHROCYTE [DISTWIDTH] IN BLOOD BY AUTOMATED COUNT: 13.4 % (ref 11.9–14.6)
GLUCOSE SERPL-MCNC: 116 MG/DL (ref 65–100)
GLUCOSE UR QL STRIP.AUTO: NEGATIVE MG/DL
HCT VFR BLD AUTO: 36.7 % (ref 35.8–46.3)
HGB BLD-MCNC: 12.9 G/DL (ref 11.7–15.4)
IMM GRANULOCYTES # BLD AUTO: 0 K/UL (ref 0–0.5)
IMM GRANULOCYTES NFR BLD AUTO: 1 % (ref 0–5)
INR PPP: 0.9
KETONES UR-MCNC: NEGATIVE MG/DL
LEUKOCYTE ESTERASE UR QL STRIP: ABNORMAL
LYMPHOCYTES # BLD: 0.5 K/UL (ref 0.5–4.6)
LYMPHOCYTES NFR BLD: 10 % (ref 13–44)
MAGNESIUM SERPL-MCNC: 2.1 MG/DL (ref 1.8–2.4)
MCH RBC QN AUTO: 33.9 PG (ref 26.1–32.9)
MCHC RBC AUTO-ENTMCNC: 35.1 G/DL (ref 31.4–35)
MCV RBC AUTO: 96.3 FL (ref 82–102)
MM INDURATION, POC: 0 MM (ref 0–5)
MONOCYTES # BLD: 0.4 K/UL (ref 0.1–1.3)
MONOCYTES NFR BLD: 8 % (ref 4–12)
NEUTS SEG # BLD: 4.1 K/UL (ref 1.7–8.2)
NEUTS SEG NFR BLD: 81 % (ref 43–78)
NITRITE UR QL: NEGATIVE
NRBC # BLD: 0 K/UL (ref 0–0.2)
PH UR: 6 (ref 5–9)
PLATELET # BLD AUTO: 174 K/UL (ref 150–450)
PMV BLD AUTO: 10.8 FL (ref 9.4–12.3)
POTASSIUM SERPL-SCNC: 2.5 MMOL/L (ref 3.5–5.1)
PPD, POC: NEGATIVE
PROT UR QL: 100 MG/DL
PROTHROMBIN TIME: 12.8 SEC (ref 12.6–14.3)
RBC # BLD AUTO: 3.81 M/UL (ref 4.05–5.2)
RBC # UR STRIP: ABNORMAL
REFLEX: NORMAL
SODIUM SERPL-SCNC: 135 MMOL/L (ref 133–143)
SP GR UR: >1.03 (ref 1–1.02)
UROBILINOGEN UR QL: 0.2 EU/DL (ref 0.2–1)
WBC # BLD AUTO: 5 K/UL (ref 4.3–11.1)

## 2023-03-28 PROCEDURE — 6370000000 HC RX 637 (ALT 250 FOR IP): Performed by: FAMILY MEDICINE

## 2023-03-28 PROCEDURE — 85025 COMPLETE CBC W/AUTO DIFF WBC: CPT

## 2023-03-28 PROCEDURE — 1100000000 HC RM PRIVATE

## 2023-03-28 PROCEDURE — 80048 BASIC METABOLIC PNL TOTAL CA: CPT

## 2023-03-28 PROCEDURE — 2580000003 HC RX 258: Performed by: FAMILY MEDICINE

## 2023-03-28 PROCEDURE — 85610 PROTHROMBIN TIME: CPT

## 2023-03-28 PROCEDURE — 6360000002 HC RX W HCPCS: Performed by: FAMILY MEDICINE

## 2023-03-28 PROCEDURE — 6360000002 HC RX W HCPCS: Performed by: INTERNAL MEDICINE

## 2023-03-28 PROCEDURE — 2580000003 HC RX 258: Performed by: INTERNAL MEDICINE

## 2023-03-28 PROCEDURE — 36415 COLL VENOUS BLD VENIPUNCTURE: CPT

## 2023-03-28 PROCEDURE — 83735 ASSAY OF MAGNESIUM: CPT

## 2023-03-28 PROCEDURE — 97530 THERAPEUTIC ACTIVITIES: CPT

## 2023-03-28 PROCEDURE — 82550 ASSAY OF CK (CPK): CPT

## 2023-03-28 PROCEDURE — 87324 CLOSTRIDIUM AG IA: CPT

## 2023-03-28 RX ORDER — CALCIUM CARBONATE 200(500)MG
500 TABLET,CHEWABLE ORAL 3 TIMES DAILY PRN
Status: DISCONTINUED | OUTPATIENT
Start: 2023-03-28 | End: 2023-03-29 | Stop reason: HOSPADM

## 2023-03-28 RX ORDER — POTASSIUM CHLORIDE 20 MEQ/1
40 TABLET, EXTENDED RELEASE ORAL 2 TIMES DAILY WITH MEALS
Status: COMPLETED | OUTPATIENT
Start: 2023-03-28 | End: 2023-03-29

## 2023-03-28 RX ORDER — LOPERAMIDE HYDROCHLORIDE 2 MG/1
2 CAPSULE ORAL 4 TIMES DAILY PRN
Status: DISCONTINUED | OUTPATIENT
Start: 2023-03-28 | End: 2023-03-29 | Stop reason: HOSPADM

## 2023-03-28 RX ORDER — POTASSIUM CHLORIDE 7.45 MG/ML
10 INJECTION INTRAVENOUS
Status: COMPLETED | OUTPATIENT
Start: 2023-03-28 | End: 2023-03-28

## 2023-03-28 RX ADMIN — POTASSIUM CHLORIDE 10 MEQ: 7.46 INJECTION, SOLUTION INTRAVENOUS at 09:41

## 2023-03-28 RX ADMIN — CIPROFLOXACIN 400 MG: 2 INJECTION, SOLUTION INTRAVENOUS at 06:24

## 2023-03-28 RX ADMIN — POTASSIUM CHLORIDE 40 MEQ: 1500 TABLET, EXTENDED RELEASE ORAL at 09:40

## 2023-03-28 RX ADMIN — CHOLECALCIFEROL TAB 125 MCG (5000 UNIT) 5000 UNITS: 125 TAB at 09:41

## 2023-03-28 RX ADMIN — ENOXAPARIN SODIUM 40 MG: 40 INJECTION SUBCUTANEOUS at 22:33

## 2023-03-28 RX ADMIN — SODIUM CHLORIDE, POTASSIUM CHLORIDE, SODIUM LACTATE AND CALCIUM CHLORIDE: 600; 310; 30; 20 INJECTION, SOLUTION INTRAVENOUS at 04:10

## 2023-03-28 RX ADMIN — SODIUM CHLORIDE, PRESERVATIVE FREE 10 ML: 5 INJECTION INTRAVENOUS at 20:44

## 2023-03-28 RX ADMIN — METOPROLOL TARTRATE 50 MG: 50 TABLET ORAL at 20:43

## 2023-03-28 RX ADMIN — LOPERAMIDE HYDROCHLORIDE 2 MG: 2 CAPSULE ORAL at 16:22

## 2023-03-28 RX ADMIN — SERTRALINE 200 MG: 100 TABLET, FILM COATED ORAL at 09:41

## 2023-03-28 RX ADMIN — METRONIDAZOLE 500 MG: 500 TABLET ORAL at 06:24

## 2023-03-28 RX ADMIN — METRONIDAZOLE 500 MG: 500 TABLET ORAL at 14:14

## 2023-03-28 RX ADMIN — CALCIUM CARBONATE (ANTACID) CHEW TAB 500 MG 500 MG: 500 CHEW TAB at 22:33

## 2023-03-28 RX ADMIN — SODIUM CHLORIDE, PRESERVATIVE FREE 10 ML: 5 INJECTION INTRAVENOUS at 09:40

## 2023-03-28 RX ADMIN — PANTOPRAZOLE SODIUM 40 MG: 40 TABLET, DELAYED RELEASE ORAL at 06:24

## 2023-03-28 RX ADMIN — POTASSIUM CHLORIDE 10 MEQ: 7.46 INJECTION, SOLUTION INTRAVENOUS at 10:35

## 2023-03-28 RX ADMIN — METRONIDAZOLE 500 MG: 500 TABLET ORAL at 22:33

## 2023-03-28 RX ADMIN — CIPROFLOXACIN 400 MG: 2 INJECTION, SOLUTION INTRAVENOUS at 17:52

## 2023-03-28 RX ADMIN — OXYMETAZOLINE HCL 2 SPRAY: 0.05 SPRAY NASAL at 10:36

## 2023-03-28 RX ADMIN — CYANOCOBALAMIN TAB 1000 MCG 500 MCG: 1000 TAB at 09:40

## 2023-03-28 RX ADMIN — Medication 100 MG: at 09:40

## 2023-03-28 RX ADMIN — POTASSIUM CHLORIDE 40 MEQ: 1500 TABLET, EXTENDED RELEASE ORAL at 17:54

## 2023-03-28 RX ADMIN — LORAZEPAM 1 MG: 2 INJECTION INTRAMUSCULAR; INTRAVENOUS at 16:34

## 2023-03-28 RX ADMIN — OXYMETAZOLINE HCL 2 SPRAY: 0.05 SPRAY NASAL at 21:15

## 2023-03-28 RX ADMIN — POTASSIUM CHLORIDE 10 MEQ: 7.46 INJECTION, SOLUTION INTRAVENOUS at 12:50

## 2023-03-28 RX ADMIN — ONDANSETRON 4 MG: 2 INJECTION INTRAMUSCULAR; INTRAVENOUS at 14:14

## 2023-03-28 RX ADMIN — METOPROLOL TARTRATE 50 MG: 50 TABLET ORAL at 09:40

## 2023-03-28 ASSESSMENT — PAIN SCALES - GENERAL
PAINLEVEL_OUTOF10: 0
PAINLEVEL_OUTOF10: 7
PAINLEVEL_OUTOF10: 0

## 2023-03-28 ASSESSMENT — PAIN DESCRIPTION - LOCATION: LOCATION: HEAD;NECK

## 2023-03-28 ASSESSMENT — PAIN DESCRIPTION - ORIENTATION: ORIENTATION: MID;LOWER

## 2023-03-28 NOTE — PROGRESS NOTES
Hospitalist Progress Note   Admit Date:  3/26/2023 11:30 PM   Name:  Myah Garcia   Age:  57 y.o. Sex:  female  :  1960   MRN:  [id number]   Room:  [facility]    Presenting/Chief Complaint: Emesis     Reason(s) for Admission: Rhabdomyolysis [M62.82]  Dehydration [E86.0]  Hypokalemia [E87.6]  Colitis [K52.9]  Generalized weakness [R53.1]  Elevated CPK [R74.8]  Nausea vomiting and diarrhea [R11.2, R19.7]  Elevated procalcitonin [R79.89]     Hospital Course:   Myah Garcia is a 57 y.o. female with medical history of pretension, COPD, history of alcohol and drug use who presents to the emergency room after being found down at home after welfare check called in by her daughter. Laboratory work-up with rhabdomyolysis CK of 1357, procalcitonin of 2.97, WBC of 11.0. CT abdomen pelvis with mild colitis. UDS positive for benzodiazepine and THC. Code S called 3/27 night due to severe headache and subjective paresthesia of left lower extremity. Found to have patient likely in alcohol withdrawals. No true focal neurological deficit at this time. CT head negative for acute pathology. Subjective & 24hr Events (23): Patient is seen at the bedside. Reports she is feeling better. Wants to go home. Denies chest pain, palpitation, nausea, vomiting or abdominal pain. Potassium 2.5, will replete with IV replacement. Still with significant diarrhea. C. difficile negative. Assessment & Plan:   Colitis  CT images reviewed. C. difficile negative  -continue with ciprofloxacin and Flagyl.  -Continue IV fluids  -On regular diet. Hypokalemia:  -Potassium 2.5, likely in the setting of diarrhea  -Replete with IV replacement    Rhabdomyolysis  -CK improving  -Continue IV fluids. Renal function stable at this time. Essential hypertension: on metoprolol    COPD (chronic obstructive pulmonary disease)  - stable.  No wheezing on exam     Thyroid Nodule  - Follows with  expansion. Abdomen:   Soft, nontender, nondistended. Extremities: No cyanosis or clubbing. No edema  Skin:     No rashes and normal coloration. Warm and dry. Neuro:  CN II-XII grossly intact. Psych:  Normal mood and affect.       I have personally reviewed labs and tests:  Recent Labs:  Recent Results (from the past 48 hour(s))   CBC with Auto Differential    Collection Time: 03/26/23 11:36 PM   Result Value Ref Range    WBC 11.0 4.3 - 11.1 K/uL    RBC 4.49 4.05 - 5.2 M/uL    Hemoglobin 15.2 11.7 - 15.4 g/dL    Hematocrit 44.2 35.8 - 46.3 %    MCV 98.4 82 - 102 FL    MCH 33.9 (H) 26.1 - 32.9 PG    MCHC 34.4 31.4 - 35.0 g/dL    RDW 13.6 11.9 - 14.6 %    Platelets 888 214 - 876 K/uL    MPV 10.0 9.4 - 12.3 FL    nRBC 0.00 0.0 - 0.2 K/uL    Differential Type AUTOMATED      Seg Neutrophils 85 (H) 43 - 78 %    Lymphocytes 7 (L) 13 - 44 %    Monocytes 7 4.0 - 12.0 %    Eosinophils % 0 (L) 0.5 - 7.8 %    Basophils 0 0.0 - 2.0 %    Immature Granulocytes 1 0.0 - 5.0 %    Segs Absolute 9.4 (H) 1.7 - 8.2 K/UL    Absolute Lymph # 0.8 0.5 - 4.6 K/UL    Absolute Mono # 0.8 0.1 - 1.3 K/UL    Absolute Eos # 0.0 0.0 - 0.8 K/UL    Basophils Absolute 0.0 0.0 - 0.2 K/UL    Absolute Immature Granulocyte 0.1 0.0 - 0.5 K/UL   CMP    Collection Time: 03/26/23 11:36 PM   Result Value Ref Range    Sodium 132 (L) 133 - 143 mmol/L    Potassium 3.1 (L) 3.5 - 5.1 mmol/L    Chloride 98 (L) 101 - 110 mmol/L    CO2 27 21 - 32 mmol/L    Anion Gap 7 2 - 11 mmol/L    Glucose 138 (H) 65 - 100 mg/dL    BUN 19 8 - 23 MG/DL    Creatinine 0.80 0.6 - 1.0 MG/DL    Est, Glom Filt Rate >60 >60 ml/min/1.73m2    Calcium 9.2 8.3 - 10.4 MG/DL    Total Bilirubin 0.6 0.2 - 1.1 MG/DL    ALT 17 12 - 65 U/L    AST 50 (H) 15 - 37 U/L    Alk Phosphatase 85 50 - 136 U/L    Total Protein 7.0 6.3 - 8.2 g/dL    Albumin 3.6 3.2 - 4.6 g/dL    Globulin 3.4 2.8 - 4.5 g/dL    Albumin/Globulin Ratio 1.1 0.4 - 1.6     Procalcitonin    Collection Time: 03/26/23 11:36 PM

## 2023-03-28 NOTE — CARE COORDINATION
Patient admitted with TONI/Rhadomylosis/Colitis. + UDS. CM met with patient after therapy recommends STR. Patient is alert and oriented X 4. Patient verifies insurance, PCP, and demographic. Patient reports she is on disability. Patient is independent of ADLs. Patient lives alone in a mobile home with 3 ADAN. Pt reports her mobile home is about to be displaced, along with 3 others, as the land it sits on is being sold. Patient will be able to return to her mobile home in another location after approximately 3 months. Patient reports plan to live with her son in the interim. Patient also has a daughter, Naun Alanis, who manages patient's finances since patient was recently diagnosed with dementia. DME: Mariama Mazariegos. [facility] of STR at [facility]. No history of home health. CM provided patient with [facility] STR choices list. CM will return for preferences. 03/28/23 1016   Service Assessment   Patient Orientation Alert and Oriented   Cognition Alert   History Provided By Patient   Primary [address]   Patient's Healthcare Decision Maker is: Legal Next of Kin   PCP Verified by CM Yes  (Dr Isidro Olszewski)   Last Visit to PCP Within last 3 months   Prior Functional Level Independent in ADLs/IADLs   Current Functional Level Independent in ADLs/IADLs   Can patient return to prior living arrangement Yes   Ability to make needs known: Fair   Family able to assist with home care needs: Yes   Would you like for me to discuss the discharge plan with any other family members/significant others, and if so, who?  No   Financial Resources Medicare   Social/Functional History   Lives With Alone   Type of Home Mobile home   Home Layout One level   [facility] [address] to enter with rails   Entrance Stairs - Number of Steps [address] [facility];[address]   [facility]   Ambulation Assistance Needs assistance   Transfer Assistance Independent   Active  No   Patient's  Info Her children   Mode of Transportation Car   Discharge Planning   Type of Residence Trailer/Mobile Home   Living Arrangements Alone   Current Services Prior To Admission None   Potential Assistance Needed N/A   DME Ordered? No   Potential Assistance Purchasing Medications No   Type of Home Care Services None   Patient expects to be discharged to: Trailer/mobile home   Services At/After Discharge   Transition of Care Consult (CM Consult) [address] Discharge None   [employer] Information Provided?  No   Mode of Transport at Discharge   (Car)   Confirm Follow Up Transport Family

## 2023-03-28 NOTE — FLOWSHEET NOTE
2230: Patient C/O 10/10 headache and neck pain and was crying. Patient stated \"it feels like the headache I had with my last stroke. \" Charge RN made aware, MD Loretta Galloway notified. NIH performed. 2234: Code Stroke called. 2240: Loretta Galloway MD at bedside. 03/27/23 2230   NIHSS Stroke Scale   Interval Baseline   Level of Consciousness (1a) 0   LOC Questions (1b) 0   LOC Commands (1c) 2   Best Gaze (2) 0   Visual (3) (!) 1   Facial Palsy (4) 0   Motor Arm, Left (5a) 1   Motor Arm, Right (5b) 1   Motor Leg, Left (6a) 1   Motor Leg, Right (6b) 1   Limb Ataxia (7) 0   Sensory (8) 0   Best Language (9) 0   Dysarthria (10) 0   Extinction and Inattention (11) 0   Total 7   NIHSS Stroke Scale Assessed Yes       Orders received. CT results called to doctor. New orders.

## 2023-03-28 NOTE — FLOWSHEET NOTE
03/28/23 1623   CIWA-Ar   /80   Heart Rate 87   Nausea and Vomiting 0   Tactile Disturbances 0   Tremor 1   Auditory Disturbances 2   Paroxysmal Sweats 0   Visual Disturbances 0   Anxiety 4   Headache, Fullness in Head 0   Agitation 1   Orientation and Clouding of Sensorium 0   CIWA-Ar Total (!) 8       1 mg IV Ativan given. Will continue to monitor.

## 2023-03-28 NOTE — CARE COORDINATION
CM met with patient this AM for SNF preferences. Patient reports she is discharging to home. Patient states Dr Scott Busby has set up a counselor to see her at home. Patient declines supportive care at discharge.

## 2023-03-28 NOTE — SIGNIFICANT EVENT
sensitive for evaluation of acute ischemia within the first 24 to 48 hours. 2. Volume loss and moderate to severe chronic small vessel ischemic changes. Head and neck CTA 1. No large vessel occlusion or high-grade stenosis within the major arteries the head or neck. 2. Carotid bifurcation atherosclerosis without hemodynamically significant stenosis by NASCET criteria. I communicated these results by phone to  Nurse Prema Vera at 9:43 PM [facility] time on 3/27/2023. The results were communicated back and were said to be understood. Luz Rojas M.D. 3/27/2023 11:45:00 PM    CT HEAD WO CONTRAST    Result Date: 3/27/2023  EXAMINATION: CT HEAD WO CONTRAST, CTA HEAD NECK W WO CONTRAST DATE: 3/27/2023 10:45 PM  INDICATION: Stroke. Patient stopped talking, was jittery and not moving properly  COMPARISON: Brain MRI October 2, 2018  TECHNIQUE: Thin section noncontrast axial images were obtained through the head. CT angiography through the head and neck was performed in the axial plane using  60 mL of Isovue-370 administered intravenously, without adverse reaction. Coronal and sagittal MIP and MPR images were generated. CT dose lowering techniques were used, to include: automated exposure control, adjustment for patient size, and or use of iterative reconstruction. FINDINGS: CT Head: Intracranial Contents: Motion degraded study. Generalized volume loss. No mass effect, midline shift, hydrocephalus, herniation, or extra axial fluid collection. No acute intracranial hemorrhage. Patchy and confluent hypodensities in the periventricular white matter, which are nonspecific, but are most commonly related to chronic small vessel ischemic changes. Bones and Extracranial Soft Tissues: The calvarium is intact. Normal extracranial soft tissues. CTA of the neck: Aorta Mild atherosclerosis of the aortic arch. The great vessel origins are patent. Right Carotid Patent right common carotid artery.  Mild atherosclerosis at the right Oral 3 times per day    potassium chloride (KLOR-CON M) extended release tablet 40 mEq  40 mEq Oral PRN    Or    potassium bicarb-citric acid (EFFER-K) effervescent tablet 40 mEq  40 mEq Oral PRN    Or    potassium chloride 10 mEq/100 mL IVPB (Peripheral Line)  10 mEq IntraVENous PRN    nicotine (NICODERM CQ) 21 MG/24HR 1 patch  1 patch TransDERmal Daily    magnesium sulfate 2000 mg in 50 mL IVPB premix  2,000 mg IntraVENous PRN    tuberculin injection 5 Units  5 Units IntraDERmal Once    oxymetazoline (AFRIN) 0.05 % nasal spray 2 spray  2 spray Each Nostril BID    enoxaparin (LOVENOX) injection 40 mg  40 mg SubCUTAneous QHS    0.9 % sodium chloride bolus  100 mL IntraVENous Once    [START ON 3/28/2023] sodium chloride flush 0.9 % injection 5-40 mL  5-40 mL IntraVENous 2 times per day    sodium chloride flush 0.9 % injection 5-40 mL  5-40 mL IntraVENous PRN    0.9 % sodium chloride infusion   IntraVENous PRN    [START ON 3/28/2023] thiamine tablet 100 mg  100 mg Oral Daily    LORazepam (ATIVAN) tablet 1 mg  1 mg Oral Q1H PRN    Or    LORazepam (ATIVAN) injection 1 mg  1 mg IntraVENous Q1H PRN    Or    LORazepam (ATIVAN) tablet 2 mg  2 mg Oral Q1H PRN    Or    LORazepam (ATIVAN) injection 2 mg  2 mg IntraVENous Q1H PRN    Or    LORazepam (ATIVAN) tablet 3 mg  3 mg Oral Q1H PRN    Or    LORazepam (ATIVAN) injection 3 mg  3 mg IntraVENous Q1H PRN    Or    LORazepam (ATIVAN) tablet 4 mg  4 mg Oral Q1H PRN    Or    LORazepam (ATIVAN) injection 4 mg  4 mg IntraVENous Q1H PRN       Signed:  Sherwin Wilcox MD

## 2023-03-28 NOTE — PROGRESS NOTES
RRT Clinical Rounding Nurse Update    Vitals:    03/27/23 2239 03/27/23 2239 03/28/23 0104 03/28/23 0453   BP: (!) 133/95 (!) 133/95 127/87 107/65   Pulse:  (!) 114 98 73   Resp:  21 18 18   Temp:   98.1 °F (36.7 °C) 99.5 °F (37.5 °C)   TempSrc:   Oral Oral   SpO2:  93% 94% 91%   Weight:    128 lb 11.2 oz (58.4 kg)   Height:            DETERIORATION INDEX SCORE: 26    ASSESSMENT:  Previous outreach assessment was reviewed. There have been no significant changes since previous assessment. PLAN:  Will follow per RRT Clinical Rounding Program protocol.     Ayesha Miller RN  [place]: [place]: [phone]

## 2023-03-28 NOTE — FLOWSHEET NOTE
03/28/23 1623 03/28/23 1723   CIWA-Ar   Nausea and Vomiting 0 0   Tactile Disturbances 0 0   Tremor 1 0   Auditory Disturbances 2 0   Paroxysmal Sweats 0 0   Visual Disturbances 0 0   Anxiety 4 1   Headache, Fullness in Head 0 0   Agitation 1 0   Orientation and Clouding of Sensorium 0 0   CIWA-Ar Total (!) 8 1       1 hour post 1 mg IV Ativan given.  Will continue to monitor

## 2023-03-28 NOTE — PROGRESS NOTES
Standing balance to improve functional Activity tolerance, Balance, Mobility, and Strength.     TREATMENT GRID:  N/A    AFTER TREATMENT PRECAUTIONS: Bed/Chair Locked, Call light within reach, Chair, and Needs within reach    INTERDISCIPLINARY COLLABORATION:  RN/ PCT and PT/ PTA    EDUCATION:      TIME IN/OUT:  Time In: 1105  Time Out: 1130  Minutes: 25    Jarvis Partida PTA

## 2023-03-28 NOTE — PLAN OF CARE
Problem: Discharge Planning  Goal: Discharge to home or other facility with appropriate resources  Outcome: Progressing  Flowsheets (Taken 3/28/2023 0810)  Discharge to home or other facility with appropriate resources:   Identify barriers to discharge with patient and caregiver   Arrange for needed discharge resources and transportation as appropriate   Identify discharge learning needs (meds, wound care, etc)

## 2023-03-28 NOTE — PROGRESS NOTES
Pt daughter, casi, called for update. All questions/concerns addressed. 1556: HoozOn Dr. Love Mcfarlane as daughter was concerned about pt having suicidal ideation. Assessed and pt does not present with any suicidal ideation as of now. Orders from Dr. Love Mcfarlane to continue to monitor and can consult psych if needed. Will reassess and pass along to night shift RN.

## 2023-03-28 NOTE — PROGRESS NOTES
Rapid Response Team Note      Subjective: Responded to Code Stroke secondary to severe headache, decreased sensation lower extremities. Summary: Code S called d/t severe headache/ decreased sensation LLE. Upon entering room, pt crying uncontrollably and complaining of a headache. Pt A&Ox4, follows commands across all extremities, NIH 1. Dr. Erika Lemus at bedside, new orders given for STAT CT/CTA, PT/INR, 1mg Ativan now, & CIWA protocol. See Rapid Response/Code Blue Narrator for full documentation    Outcome: Patient to remain in current location. Will follow-up per Critical Care Clinical Rounding protocol.     Roxana Carrasco RN  [place]: [place]: [phone]

## 2023-03-29 VITALS
DIASTOLIC BLOOD PRESSURE: 92 MMHG | OXYGEN SATURATION: 99 % | HEIGHT: 65 IN | RESPIRATION RATE: 16 BRPM | HEART RATE: 72 BPM | TEMPERATURE: 97.5 F | SYSTOLIC BLOOD PRESSURE: 143 MMHG | WEIGHT: 129.1 LBS | BODY MASS INDEX: 21.51 KG/M2

## 2023-03-29 LAB
ANION GAP SERPL CALC-SCNC: 6 MMOL/L (ref 2–11)
BASOPHILS # BLD: 0 K/UL (ref 0–0.2)
BASOPHILS NFR BLD: 0 % (ref 0–2)
BUN SERPL-MCNC: 5 MG/DL (ref 8–23)
CALCIUM SERPL-MCNC: 8.9 MG/DL (ref 8.3–10.4)
CHLORIDE SERPL-SCNC: 107 MMOL/L (ref 101–110)
CK SERPL-CCNC: 866 U/L (ref 21–215)
CO2 SERPL-SCNC: 25 MMOL/L (ref 21–32)
CREAT SERPL-MCNC: 0.5 MG/DL (ref 0.6–1)
DIFFERENTIAL METHOD BLD: ABNORMAL
EOSINOPHIL # BLD: 0.1 K/UL (ref 0–0.8)
EOSINOPHIL NFR BLD: 1 % (ref 0.5–7.8)
ERYTHROCYTE [DISTWIDTH] IN BLOOD BY AUTOMATED COUNT: 13.5 % (ref 11.9–14.6)
GLUCOSE SERPL-MCNC: 123 MG/DL (ref 65–100)
HCT VFR BLD AUTO: 37.6 % (ref 35.8–46.3)
HGB BLD-MCNC: 12.9 G/DL (ref 11.7–15.4)
IMM GRANULOCYTES # BLD AUTO: 0 K/UL (ref 0–0.5)
IMM GRANULOCYTES NFR BLD AUTO: 1 % (ref 0–5)
LYMPHOCYTES # BLD: 1.3 K/UL (ref 0.5–4.6)
LYMPHOCYTES NFR BLD: 23 % (ref 13–44)
MAGNESIUM SERPL-MCNC: 1.7 MG/DL (ref 1.8–2.4)
MCH RBC QN AUTO: 33.6 PG (ref 26.1–32.9)
MCHC RBC AUTO-ENTMCNC: 34.3 G/DL (ref 31.4–35)
MCV RBC AUTO: 97.9 FL (ref 82–102)
MM INDURATION, POC: 0 MM (ref 0–5)
MONOCYTES # BLD: 0.6 K/UL (ref 0.1–1.3)
MONOCYTES NFR BLD: 10 % (ref 4–12)
NEUTS SEG # BLD: 3.8 K/UL (ref 1.7–8.2)
NEUTS SEG NFR BLD: 65 % (ref 43–78)
NRBC # BLD: 0 K/UL (ref 0–0.2)
PLATELET # BLD AUTO: 198 K/UL (ref 150–450)
PMV BLD AUTO: 10.5 FL (ref 9.4–12.3)
POTASSIUM SERPL-SCNC: 3.5 MMOL/L (ref 3.5–5.1)
PPD, POC: NEGATIVE
RBC # BLD AUTO: 3.84 M/UL (ref 4.05–5.2)
SODIUM SERPL-SCNC: 138 MMOL/L (ref 133–143)
WBC # BLD AUTO: 5.8 K/UL (ref 4.3–11.1)

## 2023-03-29 PROCEDURE — 6360000002 HC RX W HCPCS: Performed by: FAMILY MEDICINE

## 2023-03-29 PROCEDURE — 82550 ASSAY OF CK (CPK): CPT

## 2023-03-29 PROCEDURE — 2580000003 HC RX 258: Performed by: FAMILY MEDICINE

## 2023-03-29 PROCEDURE — 6370000000 HC RX 637 (ALT 250 FOR IP): Performed by: FAMILY MEDICINE

## 2023-03-29 PROCEDURE — 36415 COLL VENOUS BLD VENIPUNCTURE: CPT

## 2023-03-29 PROCEDURE — 83735 ASSAY OF MAGNESIUM: CPT

## 2023-03-29 PROCEDURE — 85025 COMPLETE CBC W/AUTO DIFF WBC: CPT

## 2023-03-29 PROCEDURE — 2580000003 HC RX 258: Performed by: INTERNAL MEDICINE

## 2023-03-29 PROCEDURE — 80048 BASIC METABOLIC PNL TOTAL CA: CPT

## 2023-03-29 RX ORDER — CIPROFLOXACIN 500 MG/1
500 TABLET, FILM COATED ORAL 2 TIMES DAILY
Qty: 10 TABLET | Refills: 0 | Status: SHIPPED | OUTPATIENT
Start: 2023-03-29 | End: 2023-04-03

## 2023-03-29 RX ORDER — METRONIDAZOLE 500 MG/1
500 TABLET ORAL EVERY 8 HOURS SCHEDULED
Qty: 15 TABLET | Refills: 0 | Status: SHIPPED | OUTPATIENT
Start: 2023-03-29 | End: 2023-04-03

## 2023-03-29 RX ORDER — POTASSIUM CHLORIDE 20 MEQ/1
20 TABLET, EXTENDED RELEASE ORAL DAILY
Qty: 7 TABLET | Refills: 0 | Status: SHIPPED | OUTPATIENT
Start: 2023-03-29 | End: 2023-04-05

## 2023-03-29 RX ORDER — LANOLIN ALCOHOL/MO/W.PET/CERES
100 CREAM (GRAM) TOPICAL DAILY
Qty: 30 TABLET | Refills: 0 | Status: SHIPPED | OUTPATIENT
Start: 2023-03-30 | End: 2023-04-29

## 2023-03-29 RX ORDER — ADHESIVE TAPE 3"X 2.3 YD
200 TAPE, NON-MEDICATED TOPICAL DAILY
Qty: 14 TABLET | Refills: 0 | Status: SHIPPED | OUTPATIENT
Start: 2023-03-29 | End: 2023-04-12

## 2023-03-29 RX ORDER — MAGNESIUM SULFATE IN WATER 40 MG/ML
2000 INJECTION, SOLUTION INTRAVENOUS ONCE
Status: COMPLETED | OUTPATIENT
Start: 2023-03-29 | End: 2023-03-29

## 2023-03-29 RX ADMIN — ACETAMINOPHEN 650 MG: 325 TABLET ORAL at 04:39

## 2023-03-29 RX ADMIN — OXYMETAZOLINE HCL 2 SPRAY: 0.05 SPRAY NASAL at 08:49

## 2023-03-29 RX ADMIN — SODIUM CHLORIDE, PRESERVATIVE FREE 10 ML: 5 INJECTION INTRAVENOUS at 08:44

## 2023-03-29 RX ADMIN — LOPERAMIDE HYDROCHLORIDE 2 MG: 2 CAPSULE ORAL at 02:48

## 2023-03-29 RX ADMIN — CYANOCOBALAMIN TAB 1000 MCG 500 MCG: 1000 TAB at 08:41

## 2023-03-29 RX ADMIN — SERTRALINE 200 MG: 100 TABLET, FILM COATED ORAL at 08:41

## 2023-03-29 RX ADMIN — CIPROFLOXACIN 400 MG: 2 INJECTION, SOLUTION INTRAVENOUS at 06:18

## 2023-03-29 RX ADMIN — MAGNESIUM SULFATE HEPTAHYDRATE 2000 MG: 40 INJECTION, SOLUTION INTRAVENOUS at 09:49

## 2023-03-29 RX ADMIN — LORAZEPAM 1 MG: 1 TABLET ORAL at 02:55

## 2023-03-29 RX ADMIN — POTASSIUM CHLORIDE 40 MEQ: 1500 TABLET, EXTENDED RELEASE ORAL at 08:41

## 2023-03-29 RX ADMIN — METRONIDAZOLE 500 MG: 500 TABLET ORAL at 06:18

## 2023-03-29 RX ADMIN — SODIUM CHLORIDE, POTASSIUM CHLORIDE, SODIUM LACTATE AND CALCIUM CHLORIDE: 600; 310; 30; 20 INJECTION, SOLUTION INTRAVENOUS at 08:46

## 2023-03-29 RX ADMIN — METOPROLOL TARTRATE 50 MG: 50 TABLET ORAL at 08:40

## 2023-03-29 RX ADMIN — PANTOPRAZOLE SODIUM 40 MG: 40 TABLET, DELAYED RELEASE ORAL at 06:18

## 2023-03-29 RX ADMIN — Medication 100 MG: at 08:41

## 2023-03-29 RX ADMIN — CHOLECALCIFEROL TAB 125 MCG (5000 UNIT) 5000 UNITS: 125 TAB at 08:41

## 2023-03-29 ASSESSMENT — PAIN - FUNCTIONAL ASSESSMENT: PAIN_FUNCTIONAL_ASSESSMENT: ACTIVITIES ARE NOT PREVENTED

## 2023-03-29 ASSESSMENT — PAIN DESCRIPTION - LOCATION: LOCATION: HEAD

## 2023-03-29 ASSESSMENT — PAIN DESCRIPTION - DESCRIPTORS: DESCRIPTORS: ACHING;DISCOMFORT

## 2023-03-29 ASSESSMENT — PAIN SCALES - GENERAL
PAINLEVEL_OUTOF10: 0
PAINLEVEL_OUTOF10: 0
PAINLEVEL_OUTOF10: 3

## 2023-03-29 ASSESSMENT — PAIN DESCRIPTION - ORIENTATION: ORIENTATION: RIGHT;LEFT

## 2023-03-29 NOTE — CARE COORDINATION
Discharge order is in. Pt is discharging home today in stable condition. Pt declined STR and reported to floor CM that Dr Mauro Edmonds set up a counselor to see her at home. Pt declined supportive care at discharge. No other discharge needs were identified. Tx goals met.     03/29/23 1001   Services At/After Discharge   Transition of Care Consult (CM Consult) Discharge [address] Discharge None   [place] Resource Information Provided? No   Mode of Transport at Discharge Other (see comment)  (Family)   Confirm Follow Up Transport Family   Condition of Participation: Discharge Planning   The Patient and/or Patient Representative was provided with a Choice of Provider? Patient   The Patient and/Or Patient Representative agree with the Discharge Plan? Yes   Freedom of Choice list was provided with basic dialogue that supports the patient's individualized plan of care/goals, treatment preferences, and shares the quality data associated with the providers?   Yes

## 2023-03-29 NOTE — DISCHARGE SUMMARY
Hospitalist Discharge Summary   Admit Date:  3/26/2023 11:30 PM   DC Note date: 3/29/2023  Name:  Joni Ross   Age:  57 y.o. Sex:  female  :  1960   MRN:  [id number]   Room:  [facility]  PCP:  JEFF Hardwick NP    Presenting Complaint: Emesis     Initial Admission Diagnosis: Rhabdomyolysis [M62.82]  Dehydration [E86.0]  Hypokalemia [E87.6]  Colitis [K52.9]  Generalized weakness [R53.1]  Elevated CPK [R74.8]  Nausea vomiting and diarrhea [R11.2, R19.7]  Elevated procalcitonin [R79.89]     Problem List for this Hospitalization (present on admission):    Principal Problem:    Rhabdomyolysis  Active Problems:    Essential hypertension    COPD (chronic obstructive pulmonary disease) ([facility] [address]    Colitis  Resolved Problems:    * No resolved hospital problems. *      Hospital Course:  Joni Ross is a 57 y.o. female with medical history of pretension, COPD, history of alcohol and drug use who presented to the emergency room after being found down at home after welfare check called in by her daughter. Laboratory work-up with rhabdomyolysis CK of 1357, procalcitonin of 2.97, WBC of 11.0. CT abdomen pelvis with mild colitis. UDS positive for benzodiazepine and THC. Patient admitted with mild colitis and rhabdomyolysis. Started on empiric antibiotics Cipro/Flagyl and supportive care. C. difficile negative. Diarrhea gradually improved. Electrolytes repleted as needed. Rhabdomyolysis responded well to IV fluid resuscitation. CK trending down appropriately. Renal function stable. Electrolytes repleted as needed. Potassium 3.5 and magnesium 1.7 on the day of discharge which was repleted. Patient is given p.o. supplement on discharge and instructed to repeat BMP in 1 week. Patient denies alcohol intake but apparently as per daughter patient is a heavy drinker. UDS positive for benzodiazepine and THC. Counseled patient for cessation.      Code S called 3/27 night due to severe phone to  Nurse Ayesha Taylor at 9:43 PM [place] time on 3/27/2023. The results were communicated back and were said to be understood. Nicholas Givens M.D. 3/27/2023 11:45:00 PM    CT HEAD WO CONTRAST    Result Date: 3/27/2023  Head CT 1. Motion degraded study. Within limitations of artifact, no acute intracranial abnormality. MRI is more sensitive for evaluation of acute ischemia within the first 24 to 48 hours. 2. Volume loss and moderate to severe chronic small vessel ischemic changes. Head and neck CTA 1. No large vessel occlusion or high-grade stenosis within the major arteries the head or neck. 2. Carotid bifurcation atherosclerosis without hemodynamically significant stenosis by NASCET criteria. I communicated these results by phone to  Nurse Ayesha Taylor at 9:43 PM [facility] time on 3/27/2023. The results were communicated back and were said to be understood. Nicholas Givens M.D. 3/27/2023 11:45:00 PM    XR CHEST PORTABLE    Result Date: 3/27/2023  No acute cardiopulmonary process.   Luz Christine D.O. 3/27/2023 12:37:00 AM       Labs: Results:       BMP, Mg, Phos Recent Labs     03/27/23 0315 03/28/23  0555 03/29/23  0508   * 135 138   K 3.4* 2.5* 3.5    106 107   CO2 26 25 25   ANIONGAP 2 4 6   BUN 17 9 5*   CREATININE 0.70 0.50* 0.50*   LABGLOM >60 >60 >60   CALCIUM 8.2* 8.5 8.9   GLUCOSE 127* 116* 123*   MG 1.0* 2.1 1.7*      CBC Recent Labs     03/27/23 0315 03/28/23  0555 03/29/23  0508   WBC 7.6 5.0 5.8   RBC 3.97* 3.81* 3.84*   HGB 13.5 12.9 12.9   HCT 39.6 36.7 37.6   MCV 99.7 96.3 97.9   MCH 34.0* 33.9* 33.6*   MCHC 34.1 35.1* 34.3   RDW 13.6 13.4 13.5    174 198   MPV 9.7 10.8 10.5   NRBC 0.00 0.00 0.00   SEGS 83* 81* 65   LYMPHOPCT 10* 10* 23   EOSRELPCT 0* 0* 1   MONOPCT 7 8 10   BASOPCT 0 0 0   IMMGRAN 0 1 1   SEGSABS 6.3 4.1 3.8   LYMPHSABS 0.7 0.5 1.3   EOSABS 0.0 0.0 0.1   MONOSABS 0.5 0.4 0.6   BASOSABS 0.0 0.0 0.0   ABSIMMGRAN 0.0 0.0 0.0      LFT Recent Labs

## 2023-03-29 NOTE — PLAN OF CARE
Problem: Discharge Planning  Goal: Discharge to home or other facility with appropriate resources  Outcome: Adequate for Discharge     Problem: Skin/Tissue Integrity  Goal: Absence of new skin breakdown  Description: 1. Monitor for areas of redness and/or skin breakdown  2. Assess vascular access sites hourly  3. Every 4-6 hours minimum:  Change oxygen saturation probe site  4. Every 4-6 hours:  If on nasal continuous positive airway pressure, respiratory therapy assess nares and determine need for appliance change or resting period.   Outcome: Adequate for Discharge     Problem: Safety - Adult  Goal: Free from fall injury  Outcome: Adequate for Discharge     Problem: ABCDS Injury Assessment  Goal: Absence of physical injury  Outcome: Adequate for Discharge     Problem: Pain  Goal: Verbalizes/displays adequate comfort level or baseline comfort level  Outcome: Adequate for Discharge     Problem: Nutrition Deficit:  Goal: Optimize nutritional status  Outcome: Adequate for Discharge

## 2023-03-29 NOTE — FLOWSHEET NOTE
Discharge instructions reviewed with patient. Opportunity for questions provided. Patient voiced understanding of all discharge instructions.      03/29/23 1230   NIHSS Stroke Scale   Interval Reassessment   Level of Consciousness (1a) 0   LOC Questions (1b) 0   LOC Commands (1c) 0   Best Gaze (2) 0   Visual (3) 0   Facial Palsy (4) 0   Motor Arm, Left (5a) 0   Motor Arm, Right (5b) 0   Motor Leg, Left (6a) 0   Motor Leg, Right (6b) 0   Limb Ataxia (7) 0   Sensory (8) 0   Best Language (9) 0   Dysarthria (10) 0   Extinction and Inattention (11) 0   Total 0   NIHSS Stroke Scale Assessed Yes

## 2023-03-29 NOTE — DISCHARGE INSTRUCTIONS
Follow-up Information     JEFF Michaud NP Follow up in 1 week(s). Specialty: Nurse Practitioner  Contact information:  [place] [address] [place] [address]  [phone]            Rhabdomyolysis: Care Instructions  Your Care Instructions     When you have rhabdomyolysis (say \"gzi-avn-cw-AH-cecil-samy\"), dying muscle cells cause toxins to build up in the blood. If not treated, it can cause life-threatening damage to the body's organs. It can be caused by many things, such as severe muscle injury, some medicines (like statins), the flu, and certain blood infections. Symptoms may include weak muscles, pain, stiffness, fever, and nausea. Your urine may also be dark. You will get treatment in the hospital. If possible, the doctor will stop the cause of muscle cell death. The doctor will take steps to protect your organs. You may have to stop taking certain medicines if they are the cause of the problem. You will also get treatment to help the kidneys remove the toxins from your blood. This includes plenty of fluids. You may get fluids through a vein (by IV). You may also need dialysis. Follow-up care is a key part of your treatment and safety. Be sure to make and go to all appointments, and call your doctor if you are having problems. It's also a good idea to know your test results and keep a list of the medicines you take. How can you care for yourself at home? Take pain medicines exactly as directed. If the doctor gave you a prescription medicine for pain, take it as prescribed. If you are not taking a prescription pain medicine, ask your doctor if you can take an over-the-counter medicine. Talk to your doctor about whether you need to stop taking any medicines. Follow your doctor's instructions about stopping medicines. Drink plenty of fluids. If you have kidney, heart, or liver disease and have to limit fluids, talk with your doctor before you increase the amount of fluids you drink.   When should you call for help? Call your doctor now or seek immediate medical care if:    You have new or worse muscle pain. You have less urine than normal or no urine. You have new swelling in your arms or feet. You have blood in your urine. Watch closely for changes in your health, and be sure to contact your doctor if you do not get better as expected. Where can you learn more? Go to http://www.woods.com/ and enter F129 to learn more about \"Rhabdomyolysis: Care Instructions. \"  Current as of: May 4, 2022               Content Version: 13.6  © 4320-3531 [employer]. Care instructions adapted under license by [facility] ([facility]). If you have questions about a medical condition or this instruction, always ask your healthcare professional. [address] any warranty or liability for your use of this information.

## 2023-03-29 NOTE — PLAN OF CARE
Problem: Discharge Planning  Goal: Discharge to home or other facility with appropriate resources  3/29/2023 1224 by [place] Cary. Rufus Nice RN  Outcome: Adequate for Discharge  3/29/2023 1119 by [place] Cary. Rufus Nice RN  Outcome: Adequate for Discharge     Problem: Skin/Tissue Integrity  Goal: Absence of new skin breakdown  Description: 1. Monitor for areas of redness and/or skin breakdown  2. Assess vascular access sites hourly  3. Every 4-6 hours minimum:  Change oxygen saturation probe site  4. Every 4-6 hours:  If on nasal continuous positive airway pressure, respiratory therapy assess nares and determine need for appliance change or resting period. 3/29/2023 1224 by Dayton Cary. Rufus Nice RN  Outcome: Adequate for Discharge  3/29/2023 1119 by Dayton Cary. Rufus Nice RN  Outcome: Adequate for Discharge     Problem: Safety - Adult  Goal: Free from fall injury  3/29/2023 1224 by Dayton Cary. Rufus Nice RN  Outcome: Adequate for Discharge  3/29/2023 1119 by Dayton Cary. Rufus Nice RN  Outcome: Adequate for Discharge     Problem: ABCDS Injury Assessment  Goal: Absence of physical injury  3/29/2023 1224 by Dayton Cary. Rufus Nice RN  Outcome: Adequate for Discharge  3/29/2023 1119 by Dayton Cary. Rufus Nice RN  Outcome: Adequate for Discharge     Problem: Pain  Goal: Verbalizes/displays adequate comfort level or baseline comfort level  3/29/2023 1224 by Dayton Cary. Rufus Nice RN  Outcome: Adequate for Discharge  3/29/2023 1119 by Dayton Cary. Rufus Nice RN  Outcome: Adequate for Discharge     Problem: Nutrition Deficit:  Goal: Optimize nutritional status  3/29/2023 1224 by Dayton Cary. Rufus iNce RN  Outcome: Adequate for Discharge  3/29/2023 1119 by Dayton Cary.  Rufus Nice RN  Outcome: Adequate for Discharge

## 2023-04-01 LAB
BACTERIA SPEC CULT: NORMAL
BACTERIA SPEC CULT: NORMAL
SERVICE CMNT-IMP: NORMAL
SERVICE CMNT-IMP: NORMAL

## 2023-04-03 NOTE — PROGRESS NOTES
Physician Progress Note      Kev Brownlee  CSN #:                  [id number]  :                       1960  ADMIT DATE:       3/26/2023 11:30 PM  [address] DATE:        3/29/2023 12:39 PM  RESPONDING  PROVIDER #:        William Grissom MD          QUERY TEXT:    Pt admitted with Rhabdomyolysis. Pt noted to have rhabdomyolysis in the HP. If possible, please document in progress notes and discharge summary if you   are evaluating and/or treating any of the following: The medical record reflects the following:  Risk Factors: 62 YOF  Clinical Indicators: H&P report  -Pt presented to ED via EMS after being   found down at home  H&P report  -Rhabdomyolysis, CK is 1,357,Renal function okay at this   time, continue to monitor, IVFs, Monitor daily CK levels  DS report  -Rhabdomyolysis responded well to IV fluid resuscitation. CK   trending down appropriately. Renal function stable  Treatment: Monitoring, IVFs, NS IV bolus,    Per [email or web address]  Traumatic rhabdomyolysis cause examples: crush syndrome, prolonged   immobilization  Nontraumatic rhabdomyolysis cause examples:  marked exertion, hyperthermia,   metabolic myopathy, drugs or toxins, infections, electrolyte disorders. Thank you,  Daved Hammans RN,C BSN  [email or web address]   [email or web address]  Options provided:  -- Traumatic rhabdomyolysis  -- Nontraumatic rhabdomyolysis  -- Other - I will add my own diagnosis  -- Disagree - Not applicable / Not valid  -- Disagree - Clinically unable to determine / Unknown  -- Refer to Clinical Documentation Reviewer    PROVIDER RESPONSE TEXT:    This patient has nontraumatic rhabdomyolysis. Query created by:  Leatha Hopper on 3/31/2023 12:43 PM      Electronically signed by:  William Grissom MD 4/3/2023 12:52 PM

## 2023-10-09 ENCOUNTER — HOSPITAL ENCOUNTER (INPATIENT)
Age: 63
LOS: 4 days | Discharge: NURSING, REHAB OR LONG-TERM CARE | End: 2023-10-13
Attending: EMERGENCY MEDICINE | Admitting: FAMILY MEDICINE
Payer: MEDICARE

## 2023-10-09 ENCOUNTER — APPOINTMENT (OUTPATIENT)
Dept: GENERAL RADIOLOGY | Age: 63
End: 2023-10-09
Payer: MEDICARE

## 2023-10-09 DIAGNOSIS — F41.9 ANXIETY: ICD-10-CM

## 2023-10-09 DIAGNOSIS — R33.9 URINARY RETENTION: ICD-10-CM

## 2023-10-09 DIAGNOSIS — E83.42 HYPOMAGNESEMIA: ICD-10-CM

## 2023-10-09 DIAGNOSIS — I95.9 HYPOTENSION, UNSPECIFIED HYPOTENSION TYPE: Primary | ICD-10-CM

## 2023-10-09 DIAGNOSIS — D72.829 LEUKOCYTOSIS, UNSPECIFIED TYPE: ICD-10-CM

## 2023-10-09 DIAGNOSIS — K52.9 COLITIS: ICD-10-CM

## 2023-10-09 PROBLEM — F41.1 GAD (GENERALIZED ANXIETY DISORDER): Status: ACTIVE | Noted: 2018-04-04

## 2023-10-09 LAB
ALBUMIN SERPL-MCNC: 2.4 G/DL (ref 3.2–4.6)
ALBUMIN/GLOB SERPL: 0.8 (ref 0.4–1.6)
ALP SERPL-CCNC: 74 U/L (ref 50–136)
ALT SERPL-CCNC: 11 U/L (ref 12–65)
AMPHET UR QL SCN: NEGATIVE
ANION GAP SERPL CALC-SCNC: 11 MMOL/L (ref 2–11)
AST SERPL-CCNC: 35 U/L (ref 15–37)
BARBITURATES UR QL SCN: NEGATIVE
BASOPHILS # BLD: 0 K/UL (ref 0–0.2)
BASOPHILS NFR BLD: 0 % (ref 0–2)
BENZODIAZ UR QL: POSITIVE
BILIRUB SERPL-MCNC: 0.7 MG/DL (ref 0.2–1.1)
BILIRUB UR QL: NEGATIVE
BUN SERPL-MCNC: 6 MG/DL (ref 8–23)
CALCIUM SERPL-MCNC: 7.6 MG/DL (ref 8.3–10.4)
CANNABINOIDS UR QL SCN: NEGATIVE
CHLORIDE SERPL-SCNC: 104 MMOL/L (ref 101–110)
CK SERPL-CCNC: 187 U/L (ref 21–215)
CO2 SERPL-SCNC: 23 MMOL/L (ref 21–32)
COCAINE UR QL SCN: NEGATIVE
CREAT SERPL-MCNC: 0.5 MG/DL (ref 0.6–1)
DIFFERENTIAL METHOD BLD: ABNORMAL
EOSINOPHIL # BLD: 0 K/UL (ref 0–0.8)
EOSINOPHIL NFR BLD: 0 % (ref 0.5–7.8)
ERYTHROCYTE [DISTWIDTH] IN BLOOD BY AUTOMATED COUNT: 12.9 % (ref 11.9–14.6)
ETHANOL SERPL-MCNC: <3 MG/DL (ref 0–0.08)
GLOBULIN SER CALC-MCNC: 3.2 G/DL (ref 2.8–4.5)
GLUCOSE SERPL-MCNC: 91 MG/DL (ref 65–100)
GLUCOSE UR QL STRIP.AUTO: NEGATIVE MG/DL
HCT VFR BLD AUTO: 33.3 % (ref 35.8–46.3)
HGB BLD-MCNC: 10.9 G/DL (ref 11.7–15.4)
IMM GRANULOCYTES # BLD AUTO: 0.4 K/UL (ref 0–0.5)
IMM GRANULOCYTES NFR BLD AUTO: 1 % (ref 0–5)
KETONES UR-MCNC: 15 MG/DL
LACTATE SERPL-SCNC: 0.8 MMOL/L (ref 0.4–2)
LEUKOCYTE ESTERASE UR QL STRIP: NEGATIVE
LYMPHOCYTES # BLD: 1.2 K/UL (ref 0.5–4.6)
LYMPHOCYTES NFR BLD: 3 % (ref 13–44)
MAGNESIUM SERPL-MCNC: 1.4 MG/DL (ref 1.8–2.4)
MCH RBC QN AUTO: 34.2 PG (ref 26.1–32.9)
MCHC RBC AUTO-ENTMCNC: 32.7 G/DL (ref 31.4–35)
MCV RBC AUTO: 104.4 FL (ref 82–102)
METHADONE UR QL: NEGATIVE
MONOCYTES # BLD: 1.6 K/UL (ref 0.1–1.3)
MONOCYTES NFR BLD: 4 % (ref 4–12)
NEUTS SEG # BLD: 36.5 K/UL (ref 1.7–8.2)
NEUTS SEG NFR BLD: 92 % (ref 43–78)
NITRITE UR QL: NEGATIVE
NRBC # BLD: 0 K/UL (ref 0–0.2)
OPIATES UR QL: NEGATIVE
PCP UR QL: NEGATIVE
PH UR: 6 (ref 5–9)
PLATELET # BLD AUTO: 110 K/UL (ref 150–450)
PLATELET COMMENT: ABNORMAL
PMV BLD AUTO: 10.4 FL (ref 9.4–12.3)
POTASSIUM SERPL-SCNC: 4 MMOL/L (ref 3.5–5.1)
PROT SERPL-MCNC: 5.6 G/DL (ref 6.3–8.2)
PROT UR QL: NEGATIVE MG/DL
RBC # BLD AUTO: 3.19 M/UL (ref 4.05–5.2)
RBC # UR STRIP: NEGATIVE
RBC MORPH BLD: ABNORMAL
RBC MORPH BLD: ABNORMAL
SERVICE CMNT-IMP: ABNORMAL
SODIUM SERPL-SCNC: 138 MMOL/L (ref 133–143)
SP GR UR: 1.01 (ref 1–1.02)
UROBILINOGEN UR QL: 0.2 EU/DL (ref 0.2–1)
WBC # BLD AUTO: 39.7 K/UL (ref 4.3–11.1)
WBC MORPH BLD: ABNORMAL

## 2023-10-09 PROCEDURE — 2580000003 HC RX 258: Performed by: EMERGENCY MEDICINE

## 2023-10-09 PROCEDURE — 2580000003 HC RX 258: Performed by: FAMILY MEDICINE

## 2023-10-09 PROCEDURE — 99285 EMERGENCY DEPT VISIT HI MDM: CPT

## 2023-10-09 PROCEDURE — 85025 COMPLETE CBC W/AUTO DIFF WBC: CPT

## 2023-10-09 PROCEDURE — 82550 ASSAY OF CK (CPK): CPT

## 2023-10-09 PROCEDURE — 80307 DRUG TEST PRSMV CHEM ANLYZR: CPT

## 2023-10-09 PROCEDURE — 96365 THER/PROPH/DIAG IV INF INIT: CPT

## 2023-10-09 PROCEDURE — 1100000000 HC RM PRIVATE

## 2023-10-09 PROCEDURE — 93005 ELECTROCARDIOGRAM TRACING: CPT | Performed by: EMERGENCY MEDICINE

## 2023-10-09 PROCEDURE — 71045 X-RAY EXAM CHEST 1 VIEW: CPT

## 2023-10-09 PROCEDURE — 83735 ASSAY OF MAGNESIUM: CPT

## 2023-10-09 PROCEDURE — 96361 HYDRATE IV INFUSION ADD-ON: CPT

## 2023-10-09 PROCEDURE — 81003 URINALYSIS AUTO W/O SCOPE: CPT

## 2023-10-09 PROCEDURE — 6360000002 HC RX W HCPCS: Performed by: EMERGENCY MEDICINE

## 2023-10-09 PROCEDURE — 80053 COMPREHEN METABOLIC PANEL: CPT

## 2023-10-09 PROCEDURE — 83605 ASSAY OF LACTIC ACID: CPT

## 2023-10-09 PROCEDURE — 51702 INSERT TEMP BLADDER CATH: CPT

## 2023-10-09 PROCEDURE — 82077 ASSAY SPEC XCP UR&BREATH IA: CPT

## 2023-10-09 PROCEDURE — 87040 BLOOD CULTURE FOR BACTERIA: CPT

## 2023-10-09 PROCEDURE — 94762 N-INVAS EAR/PLS OXIMTRY CONT: CPT

## 2023-10-09 RX ORDER — ACETAMINOPHEN 650 MG/1
650 SUPPOSITORY RECTAL EVERY 6 HOURS PRN
Status: DISCONTINUED | OUTPATIENT
Start: 2023-10-09 | End: 2023-10-13 | Stop reason: HOSPADM

## 2023-10-09 RX ORDER — ONDANSETRON 4 MG/1
4 TABLET, ORALLY DISINTEGRATING ORAL EVERY 8 HOURS PRN
Status: DISCONTINUED | OUTPATIENT
Start: 2023-10-09 | End: 2023-10-13 | Stop reason: HOSPADM

## 2023-10-09 RX ORDER — POLYETHYLENE GLYCOL 3350 17 G/17G
17 POWDER, FOR SOLUTION ORAL DAILY PRN
Status: DISCONTINUED | OUTPATIENT
Start: 2023-10-09 | End: 2023-10-13 | Stop reason: HOSPADM

## 2023-10-09 RX ORDER — SERTRALINE HYDROCHLORIDE 100 MG/1
100 TABLET, FILM COATED ORAL DAILY
Status: DISCONTINUED | OUTPATIENT
Start: 2023-10-10 | End: 2023-10-13 | Stop reason: HOSPADM

## 2023-10-09 RX ORDER — PANTOPRAZOLE SODIUM 40 MG/1
40 TABLET, DELAYED RELEASE ORAL
Status: DISCONTINUED | OUTPATIENT
Start: 2023-10-10 | End: 2023-10-13 | Stop reason: HOSPADM

## 2023-10-09 RX ORDER — ACETAMINOPHEN 325 MG/1
650 TABLET ORAL EVERY 6 HOURS PRN
Status: DISCONTINUED | OUTPATIENT
Start: 2023-10-09 | End: 2023-10-13 | Stop reason: HOSPADM

## 2023-10-09 RX ORDER — 0.9 % SODIUM CHLORIDE 0.9 %
1000 INTRAVENOUS SOLUTION INTRAVENOUS ONCE
Status: COMPLETED | OUTPATIENT
Start: 2023-10-09 | End: 2023-10-09

## 2023-10-09 RX ORDER — ONDANSETRON 2 MG/ML
4 INJECTION INTRAMUSCULAR; INTRAVENOUS EVERY 6 HOURS PRN
Status: DISCONTINUED | OUTPATIENT
Start: 2023-10-09 | End: 2023-10-13 | Stop reason: HOSPADM

## 2023-10-09 RX ORDER — GABAPENTIN 400 MG/1
800 CAPSULE ORAL 3 TIMES DAILY
Status: DISCONTINUED | OUTPATIENT
Start: 2023-10-09 | End: 2023-10-10

## 2023-10-09 RX ORDER — MAGNESIUM SULFATE IN WATER 40 MG/ML
2000 INJECTION, SOLUTION INTRAVENOUS
Status: COMPLETED | OUTPATIENT
Start: 2023-10-09 | End: 2023-10-09

## 2023-10-09 RX ORDER — CLONAZEPAM 1 MG/1
1 TABLET ORAL 3 TIMES DAILY
Status: DISCONTINUED | OUTPATIENT
Start: 2023-10-09 | End: 2023-10-11

## 2023-10-09 RX ORDER — ENOXAPARIN SODIUM 100 MG/ML
40 INJECTION SUBCUTANEOUS DAILY
Status: DISCONTINUED | OUTPATIENT
Start: 2023-10-10 | End: 2023-10-10

## 2023-10-09 RX ORDER — SODIUM CHLORIDE 9 MG/ML
INJECTION, SOLUTION INTRAVENOUS CONTINUOUS
Status: DISCONTINUED | OUTPATIENT
Start: 2023-10-09 | End: 2023-10-11

## 2023-10-09 RX ORDER — SODIUM CHLORIDE 0.9 % (FLUSH) 0.9 %
5-40 SYRINGE (ML) INJECTION EVERY 12 HOURS SCHEDULED
Status: DISCONTINUED | OUTPATIENT
Start: 2023-10-09 | End: 2023-10-13 | Stop reason: HOSPADM

## 2023-10-09 RX ORDER — METOPROLOL TARTRATE 50 MG/1
50 TABLET, FILM COATED ORAL 2 TIMES DAILY
Status: DISCONTINUED | OUTPATIENT
Start: 2023-10-09 | End: 2023-10-13 | Stop reason: HOSPADM

## 2023-10-09 RX ORDER — SODIUM CHLORIDE 9 MG/ML
INJECTION, SOLUTION INTRAVENOUS PRN
Status: DISCONTINUED | OUTPATIENT
Start: 2023-10-09 | End: 2023-10-13 | Stop reason: HOSPADM

## 2023-10-09 RX ORDER — SODIUM CHLORIDE 0.9 % (FLUSH) 0.9 %
5-40 SYRINGE (ML) INJECTION PRN
Status: DISCONTINUED | OUTPATIENT
Start: 2023-10-09 | End: 2023-10-13 | Stop reason: HOSPADM

## 2023-10-09 RX ORDER — TRAZODONE HYDROCHLORIDE 50 MG/1
150 TABLET ORAL NIGHTLY
Status: DISCONTINUED | OUTPATIENT
Start: 2023-10-09 | End: 2023-10-10

## 2023-10-09 RX ORDER — TRAMADOL HYDROCHLORIDE 50 MG/1
50 TABLET ORAL EVERY 6 HOURS PRN
Status: DISCONTINUED | OUTPATIENT
Start: 2023-10-09 | End: 2023-10-13 | Stop reason: HOSPADM

## 2023-10-09 RX ADMIN — SODIUM CHLORIDE 1000 ML: 9 INJECTION, SOLUTION INTRAVENOUS at 18:05

## 2023-10-09 RX ADMIN — SODIUM CHLORIDE: 9 INJECTION, SOLUTION INTRAVENOUS at 23:44

## 2023-10-09 RX ADMIN — SODIUM CHLORIDE 1000 ML: 9 INJECTION, SOLUTION INTRAVENOUS at 20:17

## 2023-10-09 RX ADMIN — MAGNESIUM SULFATE HEPTAHYDRATE 2000 MG: 40 INJECTION, SOLUTION INTRAVENOUS at 22:04

## 2023-10-09 RX ADMIN — PIPERACILLIN AND TAZOBACTAM 4500 MG: 4; .5 INJECTION, POWDER, FOR SOLUTION INTRAVENOUS at 20:23

## 2023-10-09 ASSESSMENT — PAIN - FUNCTIONAL ASSESSMENT: PAIN_FUNCTIONAL_ASSESSMENT: 0-10

## 2023-10-09 ASSESSMENT — LIFESTYLE VARIABLES
HOW MANY STANDARD DRINKS CONTAINING ALCOHOL DO YOU HAVE ON A TYPICAL DAY: PATIENT DOES NOT DRINK
HOW OFTEN DO YOU HAVE A DRINK CONTAINING ALCOHOL: NEVER

## 2023-10-09 ASSESSMENT — PAIN DESCRIPTION - LOCATION: LOCATION: GENERALIZED

## 2023-10-09 NOTE — ED TRIAGE NOTES
Patient arrives via EMS from home with c/o generalized weakness for past 2 days. Hypotensive on EMS arrival, 80s/40s, 700 ml NS given in route. All other VSS. Alert and oriented.

## 2023-10-09 NOTE — ED PROVIDER NOTES
[facility] Emergency Department Provider Note                   PCP:                JEFF Brand NP               Age: 70 y.o. Sex: female     MEDICAL DECISION MAKING  Complexity of Problems Addressed:   1 or more acute illness/injury that poses a threat to life or bodily function    Data Reviewed and Analyzed:  Category 1:    I have reviewed outside records from an external source for any pertinent PMH, ED visits, primary care visits, specialist visits, labs, EKG, and/or radiologic studies. Category 2:     ED EKG Interpretation  EKG was interpreted in the absence of a cardiologist.    Rate: Rate: Normal  EKG Interpretation: EKG Interpretation: sinus rhythm, no evidence of arrhythmia  ST Segments: Nonspecific ST segments - NO STEMI      I independently ordered and reviewed the labs. I have reviewed the Radiologist interpretation of the radiologic studies. The patient was admitted and I have discussed patient management with the admitting provider. Category 3:     Discussion of management or test interpretation:    MDM  Number of Diagnoses or Management Options  Hypomagnesemia  Hypotension, unspecified hypotension type  Leukocytosis, unspecified type  Urinary retention  Diagnosis management comments: Patient presented for several days of generalized weakness and found to be hypotensive. Patient did received IV fluid by EMS and was still hypotensive when she arrived. She was given another liter of IV fluid with improvement in her blood pressure but then it dropped briefly. Patient has a normal lactate but her white count is over 39,000. She was given broad-spectrum IV antibiotics after blood cultures drawn. There is no obvious source of bacterial infection. Her urine and chest x-ray were negative. Her magnesium level was moderately low when she was given IV magnesium. The rest of her labs showed no significant abnormalities. She has mild anemia and thrombocytopenia.   Patient

## 2023-10-10 ENCOUNTER — APPOINTMENT (OUTPATIENT)
Dept: NON INVASIVE DIAGNOSTICS | Age: 63
End: 2023-10-10
Attending: INTERNAL MEDICINE
Payer: MEDICARE

## 2023-10-10 ENCOUNTER — APPOINTMENT (OUTPATIENT)
Dept: CT IMAGING | Age: 63
End: 2023-10-10
Payer: MEDICARE

## 2023-10-10 PROBLEM — D69.6 THROMBOCYTOPENIA (HCC): Status: ACTIVE | Noted: 2023-10-10

## 2023-10-10 PROBLEM — R41.0 CONFUSION: Status: ACTIVE | Noted: 2023-10-10

## 2023-10-10 LAB
ALBUMIN SERPL-MCNC: 2.6 G/DL (ref 3.2–4.6)
ALBUMIN/GLOB SERPL: 0.8 (ref 0.4–1.6)
ALP SERPL-CCNC: 71 U/L (ref 50–136)
ALT SERPL-CCNC: 9 U/L (ref 12–65)
ANION GAP SERPL CALC-SCNC: 5 MMOL/L (ref 2–11)
AST SERPL-CCNC: 12 U/L (ref 15–37)
BASOPHILS # BLD: 0.1 K/UL (ref 0–0.2)
BASOPHILS NFR BLD: 0 % (ref 0–2)
BILIRUB SERPL-MCNC: 0.5 MG/DL (ref 0.2–1.1)
BUN SERPL-MCNC: 5 MG/DL (ref 8–23)
CALCIUM SERPL-MCNC: 7.9 MG/DL (ref 8.3–10.4)
CHLORIDE SERPL-SCNC: 113 MMOL/L (ref 101–110)
CO2 SERPL-SCNC: 24 MMOL/L (ref 21–32)
CORTIS AM PEAK SERPL-MCNC: 24.7 UG/DL (ref 7–25)
CREAT SERPL-MCNC: 0.5 MG/DL (ref 0.6–1)
DIFFERENTIAL METHOD BLD: ABNORMAL
ECHO AO ASC DIAM: 2.6 CM
ECHO AO ASCENDING AORTA INDEX: 1.61 CM/M2
ECHO AO ROOT DIAM: 3.1 CM
ECHO AO ROOT INDEX: 1.93 CM/M2
ECHO AV AREA PEAK VELOCITY: 2.5 CM2
ECHO AV AREA VTI: 2.3 CM2
ECHO AV AREA/BSA PEAK VELOCITY: 1.6 CM2/M2
ECHO AV AREA/BSA VTI: 1.4 CM2/M2
ECHO AV MEAN GRADIENT: 5 MMHG
ECHO AV MEAN VELOCITY: 1 M/S
ECHO AV PEAK GRADIENT: 8 MMHG
ECHO AV PEAK VELOCITY: 1.4 M/S
ECHO AV VELOCITY RATIO: 0.93
ECHO AV VTI: 30.2 CM
ECHO BSA: 1.63 M2
ECHO EST RA PRESSURE: 8 MMHG
ECHO IVC PROX: 2.1 CM
ECHO LA AREA 2C: 18.7 CM2
ECHO LA AREA 4C: 16.5 CM2
ECHO LA DIAMETER INDEX: 2.11 CM/M2
ECHO LA DIAMETER: 3.4 CM
ECHO LA MAJOR AXIS: 6 CM
ECHO LA MINOR AXIS: 5.6 CM
ECHO LA TO AORTIC ROOT RATIO: 1.1
ECHO LA VOL 2C: 50 ML (ref 22–52)
ECHO LA VOL 4C: 36 ML (ref 22–52)
ECHO LA VOL BP: 44 ML (ref 22–52)
ECHO LA VOL/BSA BIPLANE: 27 ML/M2 (ref 16–34)
ECHO LA VOLUME INDEX A2C: 31 ML/M2 (ref 16–34)
ECHO LA VOLUME INDEX A4C: 22 ML/M2 (ref 16–34)
ECHO LV E' LATERAL VELOCITY: 11 CM/S
ECHO LV E' SEPTAL VELOCITY: 10 CM/S
ECHO LV EDV A2C: 71 ML
ECHO LV EDV A4C: 53 ML
ECHO LV EDV INDEX A4C: 33 ML/M2
ECHO LV EDV NDEX A2C: 44 ML/M2
ECHO LV EJECTION FRACTION A2C: 60 %
ECHO LV EJECTION FRACTION A4C: 61 %
ECHO LV EJECTION FRACTION BIPLANE: 60 % (ref 55–100)
ECHO LV ESV A2C: 29 ML
ECHO LV ESV A4C: 21 ML
ECHO LV ESV INDEX A2C: 18 ML/M2
ECHO LV ESV INDEX A4C: 13 ML/M2
ECHO LV FRACTIONAL SHORTENING: 43 % (ref 28–44)
ECHO LV INTERNAL DIMENSION DIASTOLE INDEX: 2.92 CM/M2
ECHO LV INTERNAL DIMENSION DIASTOLIC: 4.7 CM (ref 3.9–5.3)
ECHO LV INTERNAL DIMENSION SYSTOLIC INDEX: 1.68 CM/M2
ECHO LV INTERNAL DIMENSION SYSTOLIC: 2.7 CM
ECHO LV IVSD: 0.7 CM (ref 0.6–0.9)
ECHO LV MASS 2D: 103.1 G (ref 67–162)
ECHO LV MASS INDEX 2D: 64 G/M2 (ref 43–95)
ECHO LV POSTERIOR WALL DIASTOLIC: 0.7 CM (ref 0.6–0.9)
ECHO LV RELATIVE WALL THICKNESS RATIO: 0.3
ECHO LVOT AREA: 2.8 CM2
ECHO LVOT AV VTI INDEX: 0.8
ECHO LVOT DIAM: 1.9 CM
ECHO LVOT MEAN GRADIENT: 3 MMHG
ECHO LVOT PEAK GRADIENT: 7 MMHG
ECHO LVOT PEAK VELOCITY: 1.3 M/S
ECHO LVOT STROKE VOLUME INDEX: 42.4 ML/M2
ECHO LVOT SV: 68.3 ML
ECHO LVOT VTI: 24.1 CM
ECHO MV A VELOCITY: 0.58 M/S
ECHO MV E DECELERATION TIME (DT): 224 MS
ECHO MV E VELOCITY: 0.91 M/S
ECHO MV E/A RATIO: 1.57
ECHO MV E/E' LATERAL: 8.27
ECHO MV E/E' RATIO (AVERAGED): 8.69
ECHO MV E/E' SEPTAL: 9.1
ECHO PV ACCELERATION TIME (AT): 127 MS
ECHO PV MAX VELOCITY: 1 M/S
ECHO PV PEAK GRADIENT: 4 MMHG
ECHO RIGHT VENTRICULAR SYSTOLIC PRESSURE (RVSP): 32 MMHG
ECHO RV BASAL DIMENSION: 3.2 CM
ECHO RV FREE WALL PEAK S': 13 CM/S
ECHO RV TAPSE: 2.1 CM (ref 1.7–?)
ECHO TV REGURGITANT MAX VELOCITY: 2.46 M/S
ECHO TV REGURGITANT PEAK GRADIENT: 24 MMHG
EKG ATRIAL RATE: 90 BPM
EKG DIAGNOSIS: NORMAL
EKG P AXIS: 60 DEGREES
EKG P-R INTERVAL: 144 MS
EKG Q-T INTERVAL: 371 MS
EKG QRS DURATION: 96 MS
EKG QTC CALCULATION (BAZETT): 454 MS
EKG R AXIS: 67 DEGREES
EKG T AXIS: 251 DEGREES
EKG VENTRICULAR RATE: 90 BPM
EOSINOPHIL # BLD: 0.1 K/UL (ref 0–0.8)
EOSINOPHIL NFR BLD: 1 % (ref 0.5–7.8)
ERYTHROCYTE [DISTWIDTH] IN BLOOD BY AUTOMATED COUNT: 12.8 % (ref 11.9–14.6)
GLOBULIN SER CALC-MCNC: 3.4 G/DL (ref 2.8–4.5)
GLUCOSE BLD STRIP.AUTO-MCNC: 115 MG/DL (ref 65–100)
GLUCOSE SERPL-MCNC: 88 MG/DL (ref 65–100)
HCT VFR BLD AUTO: 34.5 % (ref 35.8–46.3)
HGB BLD-MCNC: 11.1 G/DL (ref 11.7–15.4)
IMM GRANULOCYTES # BLD AUTO: 0.1 K/UL (ref 0–0.5)
IMM GRANULOCYTES NFR BLD AUTO: 1 % (ref 0–5)
LYMPHOCYTES # BLD: 1.7 K/UL (ref 0.5–4.6)
LYMPHOCYTES NFR BLD: 7 % (ref 13–44)
MAGNESIUM SERPL-MCNC: 2.2 MG/DL (ref 1.8–2.4)
MCH RBC QN AUTO: 34.2 PG (ref 26.1–32.9)
MCHC RBC AUTO-ENTMCNC: 32.2 G/DL (ref 31.4–35)
MCV RBC AUTO: 106.2 FL (ref 82–102)
MONOCYTES # BLD: 1.1 K/UL (ref 0.1–1.3)
MONOCYTES NFR BLD: 5 % (ref 4–12)
NEUTS SEG # BLD: 21 K/UL (ref 1.7–8.2)
NEUTS SEG NFR BLD: 86 % (ref 43–78)
NRBC # BLD: 0 K/UL (ref 0–0.2)
PLATELET # BLD AUTO: 204 K/UL (ref 150–450)
PMV BLD AUTO: 9.3 FL (ref 9.4–12.3)
POTASSIUM SERPL-SCNC: 2.9 MMOL/L (ref 3.5–5.1)
PROCALCITONIN SERPL-MCNC: 0.45 NG/ML (ref 0–0.49)
PROT SERPL-MCNC: 6 G/DL (ref 6.3–8.2)
RBC # BLD AUTO: 3.25 M/UL (ref 4.05–5.2)
SERVICE CMNT-IMP: ABNORMAL
SODIUM SERPL-SCNC: 142 MMOL/L (ref 133–143)
WBC # BLD AUTO: 24.1 K/UL (ref 4.3–11.1)

## 2023-10-10 PROCEDURE — 93010 ELECTROCARDIOGRAM REPORT: CPT | Performed by: INTERNAL MEDICINE

## 2023-10-10 PROCEDURE — 2580000003 HC RX 258: Performed by: FAMILY MEDICINE

## 2023-10-10 PROCEDURE — 1100000000 HC RM PRIVATE

## 2023-10-10 PROCEDURE — 82533 TOTAL CORTISOL: CPT

## 2023-10-10 PROCEDURE — 6360000002 HC RX W HCPCS: Performed by: INTERNAL MEDICINE

## 2023-10-10 PROCEDURE — 93306 TTE W/DOPPLER COMPLETE: CPT | Performed by: INTERNAL MEDICINE

## 2023-10-10 PROCEDURE — 70450 CT HEAD/BRAIN W/O DYE: CPT

## 2023-10-10 PROCEDURE — 87040 BLOOD CULTURE FOR BACTERIA: CPT

## 2023-10-10 PROCEDURE — 84145 PROCALCITONIN (PCT): CPT

## 2023-10-10 PROCEDURE — 6370000000 HC RX 637 (ALT 250 FOR IP): Performed by: FAMILY MEDICINE

## 2023-10-10 PROCEDURE — 83735 ASSAY OF MAGNESIUM: CPT

## 2023-10-10 PROCEDURE — 36415 COLL VENOUS BLD VENIPUNCTURE: CPT

## 2023-10-10 PROCEDURE — 2700000000 HC OXYGEN THERAPY PER DAY

## 2023-10-10 PROCEDURE — 6370000000 HC RX 637 (ALT 250 FOR IP): Performed by: INTERNAL MEDICINE

## 2023-10-10 PROCEDURE — 93306 TTE W/DOPPLER COMPLETE: CPT

## 2023-10-10 PROCEDURE — 82962 GLUCOSE BLOOD TEST: CPT

## 2023-10-10 PROCEDURE — 2500000003 HC RX 250 WO HCPCS: Performed by: INTERNAL MEDICINE

## 2023-10-10 PROCEDURE — 85025 COMPLETE CBC W/AUTO DIFF WBC: CPT

## 2023-10-10 PROCEDURE — 80053 COMPREHEN METABOLIC PANEL: CPT

## 2023-10-10 RX ORDER — POTASSIUM CHLORIDE 20 MEQ/1
40 TABLET, EXTENDED RELEASE ORAL PRN
Status: DISCONTINUED | OUTPATIENT
Start: 2023-10-10 | End: 2023-10-13 | Stop reason: HOSPADM

## 2023-10-10 RX ORDER — DIMETHICONE, CAMPHOR (SYNTHETIC), MENTHOL, AND PHENOL 1.1; .5; .625; .5 G/100G; G/100G; G/100G; G/100G
OINTMENT TOPICAL PRN
Status: DISCONTINUED | OUTPATIENT
Start: 2023-10-10 | End: 2023-10-13 | Stop reason: HOSPADM

## 2023-10-10 RX ORDER — POTASSIUM CHLORIDE 7.45 MG/ML
10 INJECTION INTRAVENOUS PRN
Status: DISCONTINUED | OUTPATIENT
Start: 2023-10-10 | End: 2023-10-13 | Stop reason: HOSPADM

## 2023-10-10 RX ORDER — GABAPENTIN 300 MG/1
600 CAPSULE ORAL 3 TIMES DAILY
Status: DISCONTINUED | OUTPATIENT
Start: 2023-10-10 | End: 2023-10-13 | Stop reason: HOSPADM

## 2023-10-10 RX ORDER — TRAZODONE HYDROCHLORIDE 50 MG/1
100 TABLET ORAL NIGHTLY
Status: DISCONTINUED | OUTPATIENT
Start: 2023-10-10 | End: 2023-10-13 | Stop reason: HOSPADM

## 2023-10-10 RX ORDER — 0.9 % SODIUM CHLORIDE 0.9 %
1000 INTRAVENOUS SOLUTION INTRAVENOUS ONCE
Status: COMPLETED | OUTPATIENT
Start: 2023-10-10 | End: 2023-10-10

## 2023-10-10 RX ORDER — ALBUTEROL SULFATE 90 UG/1
1 AEROSOL, METERED RESPIRATORY (INHALATION) EVERY 6 HOURS PRN
COMMUNITY

## 2023-10-10 RX ORDER — ENOXAPARIN SODIUM 100 MG/ML
40 INJECTION SUBCUTANEOUS EVERY 24 HOURS
Status: DISCONTINUED | OUTPATIENT
Start: 2023-10-10 | End: 2023-10-13 | Stop reason: HOSPADM

## 2023-10-10 RX ADMIN — TRAZODONE HYDROCHLORIDE 100 MG: 50 TABLET ORAL at 20:24

## 2023-10-10 RX ADMIN — POTASSIUM CHLORIDE 40 MEQ: 1500 TABLET, EXTENDED RELEASE ORAL at 08:36

## 2023-10-10 RX ADMIN — TRAMADOL HYDROCHLORIDE 50 MG: 50 TABLET ORAL at 16:42

## 2023-10-10 RX ADMIN — SODIUM CHLORIDE 1000 ML: 9 INJECTION, SOLUTION INTRAVENOUS at 06:51

## 2023-10-10 RX ADMIN — GABAPENTIN 800 MG: 400 CAPSULE ORAL at 00:05

## 2023-10-10 RX ADMIN — SODIUM CHLORIDE: 9 INJECTION, SOLUTION INTRAVENOUS at 02:25

## 2023-10-10 RX ADMIN — ENOXAPARIN SODIUM 40 MG: 100 INJECTION SUBCUTANEOUS at 20:25

## 2023-10-10 RX ADMIN — SERTRALINE 100 MG: 100 TABLET, FILM COATED ORAL at 08:36

## 2023-10-10 RX ADMIN — TRAZODONE HYDROCHLORIDE 150 MG: 50 TABLET ORAL at 00:09

## 2023-10-10 RX ADMIN — GABAPENTIN 600 MG: 300 CAPSULE ORAL at 20:24

## 2023-10-10 RX ADMIN — GABAPENTIN 800 MG: 400 CAPSULE ORAL at 14:54

## 2023-10-10 RX ADMIN — GABAPENTIN 800 MG: 400 CAPSULE ORAL at 08:36

## 2023-10-10 RX ADMIN — TUBERCULIN PURIFIED PROTEIN DERIVATIVE 5 UNITS: 5 INJECTION, SOLUTION INTRADERMAL at 16:09

## 2023-10-10 RX ADMIN — SODIUM CHLORIDE: 9 INJECTION, SOLUTION INTRAVENOUS at 14:56

## 2023-10-10 RX ADMIN — PANTOPRAZOLE SODIUM 40 MG: 40 TABLET, DELAYED RELEASE ORAL at 08:36

## 2023-10-10 RX ADMIN — SODIUM CHLORIDE, PRESERVATIVE FREE 10 ML: 5 INJECTION INTRAVENOUS at 20:25

## 2023-10-10 RX ADMIN — SODIUM CHLORIDE, PRESERVATIVE FREE 10 ML: 5 INJECTION INTRAVENOUS at 00:34

## 2023-10-10 ASSESSMENT — PAIN DESCRIPTION - LOCATION: LOCATION: BACK

## 2023-10-10 ASSESSMENT — PAIN SCALES - GENERAL
PAINLEVEL_OUTOF10: 0
PAINLEVEL_OUTOF10: 0
PAINLEVEL_OUTOF10: 6
PAINLEVEL_OUTOF10: 0

## 2023-10-10 ASSESSMENT — PAIN DESCRIPTION - DESCRIPTORS: DESCRIPTORS: ACHING

## 2023-10-10 NOTE — CARE COORDINATION
Case Management Assessment  Initial Evaluation    Date/Time of Evaluation: 10/10/2023 1:30 PM  Assessment Completed by: Basil Zelaya RN    If patient is discharged prior to next notation, then this note serves as note for discharge by case management. Patient Name: Munir Pollock                   YOB: 1960  Diagnosis: Hypomagnesemia [E83.42]  Urinary retention [R33.9]  Hypotension [I95.9]  Hypotension, unspecified hypotension type [I95.9]  Leukocytosis, unspecified type [D72.829]                   Date / Time: 10/9/2023  5:22 PM    Patient Admission Status: Inpatient   Readmission Risk (Low < 19, Mod (19-27), High > 27): Readmission Risk Score: 8.5    Current PCP: JEFF Ovalles NP  PCP verified by CM? (P) Yes    Chart Reviewed: Yes      History Provided by: (P) Patient  Patient Orientation: (P) Alert and Oriented    Patient Cognition: (P) Alert    Hospitalization in the last 30 days (Readmission):  No    If yes, Readmission Assessment in  Navigator will be completed.     Advance Directives:      Code Status: Full Code   Patient's Primary Decision Maker is: (P) Legal Next of Kin      Discharge Planning:    Patient lives with: Alone Type of Home: House  Primary Care Giver: (P) Self  Patient Support Systems include: (P) Children, Family Members   Current Financial resources: (P) Medicare ([employer] [place])  Current community resources: (P) None  Current services prior to admission: None            Current DME:              Type of Home Care services:  None    ADLS  Prior functional level: (P) Independent in ADLs/IADLs  Current functional level: (P) Assistance with the following:    PT AM-PAC:   /24  OT AM-PAC:   /24    Family can provide assistance at DC: (P) Yes  Would you like Case Management to discuss the discharge plan with any other family members/significant others, and if so, who? (P) Yes  Plans to Return to Present Housing: (P) Yes  Other Identified Issues/Barriers to RETURNING to current housing: pending  Potential Assistance needed at discharge: N/A            Potential DME:  pending  Patient expects to discharge to: [address] [place] [address] for transportation at discharge:  family    Financial    Payor: [place] / Plan: [place] / Product Type: *No Product type* /     Does insurance require precert for SNF: Yes    Potential assistance Purchasing Medications:  no  Meds-to-Beds request:  n/a      [id number] [place], [place] - [address] [address]  Phone: [phone] Fax: [phone]      Notes:    Factors facilitating achievement of predicted outcomes: Family support, Cooperative, and Pleasant    Barriers to discharge: pending medical treatment    Additional Case Management Notes: chart reviewed and pt seen in ICU. Alert and oriented. Confirms demographics/screen. Lives alone,independent of ADL's. Drives self. Willy Buckle Alek Mixer for ambulation. States the PCP has told her she has early dementia. Children assist if needed. Aware CM to follow for d/c needs/POC.

## 2023-10-10 NOTE — ASSESSMENT & PLAN NOTE
- No acute neuro deficits, but patient became confused in ER  - STAT CT head without acute abnormalities  - Could possibly be related to hypotension  - Monitor for improvement

## 2023-10-10 NOTE — PROGRESS NOTES
Hospitalist Progress Note   Admit Date:  10/9/2023  5:22 PM   Name:  Ranulfo Burnett   Age:  70 y.o. Sex:  female  :  1960   MRN:  [id number]   Room:  222/    Presenting/Chief Complaint: Fatigue     Reason(s) for Admission: Hypomagnesemia [E83.42]  Urinary retention [R33.9]  Hypotension [I95.9]  Hypotension, unspecified hypotension type [I95.9]  Leukocytosis, unspecified type [facility] Course:   Please refer to the admission H&P for details of presentation. In summary, Ranulfo Burnett is a 70 y.o. female with medical history significant for HTN, COPD, anxiety who presented to ED with general malaise/weakness which has been worsening over the course of past few days. Upon EMS evaluation, patient was found to be hypotensive, so IVFs were given en route to ER. Upon ER evaluation, patient's BP was still low, so she has been given 2 additional IVF boluses. Labs show WBC of 39.7. All prior WBC counts in chart have been normal.  UA is negative for UTI. CXR showed minimal linear 6 weeks opacities in left lung base likely atelectasis or scarring. After initial evaluation, daughter alerted ER that patient was not answering her phone. Upon recheck, patient was confused. STAT CT head showed no acute intracranial abnormalities but showed generalized brain volume loss and moderate to extensive small vessel ischemic changes. Subjective/24 hr Events (10/10/23) : Patient is seen and examined at bedside. No acute events reported overnight by nursing staff. Laying in bed. AAOx 3 during my evaluation. BP has been stable and has not required any pressor. Reports having dizziness on standing and with movement. Also reports blurred vision but currently not wearing glasses. Reports symptoms have been going on for a few months. Patient denies fever, chills, chest pains, shortness of breath, n/v, abdominal pain.         Review of Systems: 10 point review of systems 10/09/23  6:16 PM   Result Value Ref Range    PCP, Urine Negative NEG      Benzodiazepines, Urine Positive (A) NEG      Cocaine, Urine Negative NEG      Amphetamine, Urine Negative NEG      Methadone, Urine Negative NEG      THC, TH-Cannabinol, Urine Negative NEG      Opiates, Urine Negative NEG      Barbiturates, Urine Negative NEG     POCT Urinalysis no Micro    Collection Time: 10/09/23  6:18 PM   Result Value Ref Range    Specific Gravity, Urine, POC 1.015 1.001 - 1.023      pH, Urine, POC 6.0 5.0 - 9.0      Protein, Urine, POC Negative NEG mg/dL    Glucose, UA POC Negative NEG mg/dL    Ketones, Urine, POC 15 (A) NEG mg/dL    Bilirubin, Urine, POC Negative NEG      Blood, UA POC Negative NEG      URINE UROBILINOGEN POC 0.2 0.2 - 1.0 EU/dL    Nitrite, Urine, POC Negative NEG      Leukocyte Est, UA POC Negative NEG      Performed by: Tristen Gonzalez    POCT Glucose    Collection Time: 10/10/23 12:19 AM   Result Value Ref Range    POC Glucose 115 (H) 65 - 100 mg/dL    Performed by: Karuna    Procalcitonin    Collection Time: 10/10/23  4:30 AM   Result Value Ref Range    Procalcitonin 0.45 0.00 - 0.49 ng/mL   Comprehensive Metabolic Panel w/ Reflex to MG    Collection Time: 10/10/23  4:33 AM   Result Value Ref Range    Sodium 142 133 - 143 mmol/L    Potassium 2.9 (L) 3.5 - 5.1 mmol/L    Chloride 113 (H) 101 - 110 mmol/L    CO2 24 21 - 32 mmol/L    Anion Gap 5 2 - 11 mmol/L    Glucose 88 65 - 100 mg/dL    BUN 5 (L) 8 - 23 MG/DL    Creatinine 0.50 (L) 0.6 - 1.0 MG/DL    Est, Glom Filt Rate >60 >60 ml/min/1.73m2    Calcium 7.9 (L) 8.3 - 10.4 MG/DL    Total Bilirubin 0.5 0.2 - 1.1 MG/DL    ALT 9 (L) 12 - 65 U/L    AST 12 (L) 15 - 37 U/L    Alk Phosphatase 71 50 - 136 U/L    Total Protein 6.0 (L) 6.3 - 8.2 g/dL    Albumin 2.6 (L) 3.2 - 4.6 g/dL    Globulin 3.4 2.8 - 4.5 g/dL    Albumin/Globulin Ratio 0.8 0.4 - 1.6     CBC with Auto Differential    Collection Time: 10/10/23  4:33 AM   Result Value Ref Range

## 2023-10-10 NOTE — ACP (ADVANCE CARE PLANNING)
Advance Care Planning     Advance Care Planning Activator (Inpatient)  Conversation Note      Date of ACP Conversation: 10/10/2023         ACP Activator: Arron Jama RN    {When Decision Maker makes decisions on behalf of the incapacitated patient: Decision Maker is asked to consider and make decisions based on patient values, known preferences, or best interests. Health Care Decision Maker: Jes LW/HCPOA on file. Pt . Children are legal NOK unless document presented or completed stating otherwise. Current Designated Health Care Decision Maker: Hamzah Alicea -[phone]    Click here to complete Healthcare Decision Makers including section of the Healthcare Decision Maker Relationship (ie \"Primary\")  Today we documented Decision Maker(s) consistent with Legal Next of Kin hierarchy.

## 2023-10-10 NOTE — PROGRESS NOTES
Pt home meds at bedside narcotics taken. Gabapentin, tramadol, and klonopin taken counted and locked. Pt confused unable to sign confirmation of meds, daughter casi called and stated she would  tomorrow 10/11/23.

## 2023-10-10 NOTE — H&P
Hospitalist History and Physical   Admit Date:  10/9/2023  5:22 PM   Name:  Selina Oleary   Age:  70 y.o. Sex:  female  :  1960   MRN:  [id number]     Presenting Complaint: general malaise/weakness  Reason(s) for Admission: Hypomagnesemia [E83.42]  Urinary retention [R33.9]  Hypotension [I95.9]  Hypotension, unspecified hypotension type [I95.9]  Leukocytosis, unspecified type [D72.829]     History of Present Illness:   Selina Oleary is a 70 y.o. female with medical history of HTN, COPD, anxiety who presented to ED with general malaise/weakness. Symptoms have been worsening over the past few days. Denies fevers/chills, SOB/cough, abdominal pain, N/V/D. Upon EMS evaluation, patient was found to be hypotensive, so IVFs were given en route to ER. Upon ER evaluation, patient's BP was still low, so she has been given 2 additional IVF boluses. Labs show WBC of 39.7. All prior WBC counts in chart have been normal.  UA is negative for UTI. CXR shows:  IMPRESSION:  Minimal linear subsegmental opacities in the left lung base likely reflects   atelectasis or scarring, similar to the prior study. No other areas of definite   consolidation. As mentioned, CXR reflects similar findings to prior and no evidence of overt pneumonia. Blood cultures obtained in ER.  IV antibiotics initiated as well. Hospitalist asked to admit. After initial evaluation, daughter alerted ER that patient was not answering her phone. Upon recheck, patient was confused. STAT CT head ordered. Review of Systems:  10 systems reviewed and negative except as noted in HPI. Assessment & Plan:   Leukocytosis  Assessment & Plan  - WBC is 39.7  - No h/o elevated WBC noted in chart  - Afebrile, only symptom is generalized malaise  - CXR and UA are WNL. Lactic acid is normal.  - Would not expect a viral infection to cause this degree of leukocytosis, but will check viral panel  - ?  Recent steroid use  - Blood Albumin 2.6 (L) 3.2 - 4.6 g/dL    Globulin 3.4 2.8 - 4.5 g/dL    Albumin/Globulin Ratio 0.8 0.4 - 1.6     CBC with Auto Differential    Collection Time: 10/10/23  4:33 AM   Result Value Ref Range    WBC 24.1 (H) 4.3 - 11.1 K/uL    RBC 3.25 (L) 4.05 - 5.2 M/uL    Hemoglobin 11.1 (L) 11.7 - 15.4 g/dL    Hematocrit 34.5 (L) 35.8 - 46.3 %    .2 (H) 82 - 102 FL    MCH 34.2 (H) 26.1 - 32.9 PG    MCHC 32.2 31.4 - 35.0 g/dL    RDW 12.8 11.9 - 14.6 %    Platelets 793 502 - 590 K/uL    MPV 9.3 (L) 9.4 - 12.3 FL    nRBC 0.00 0.0 - 0.2 K/uL    Differential Type AUTOMATED      Neutrophils % 86 (H) 43 - 78 %    Lymphocytes % 7 (L) 13 - 44 %    Monocytes % 5 4.0 - 12.0 %    Eosinophils % 1 0.5 - 7.8 %    Basophils % 0 0.0 - 2.0 %    Immature Granulocytes 1 0.0 - 5.0 %    Neutrophils Absolute 21.0 (H) 1.7 - 8.2 K/UL    Lymphocytes Absolute 1.7 0.5 - 4.6 K/UL    Monocytes Absolute 1.1 0.1 - 1.3 K/UL    Eosinophils Absolute 0.1 0.0 - 0.8 K/UL    Basophils Absolute 0.1 0.0 - 0.2 K/UL    Absolute Immature Granulocyte 0.1 0.0 - 0.5 K/UL       Signed:  Bhavin Christensen MD

## 2023-10-10 NOTE — ED NOTES
TRANSFER - OUT REPORT:    Verbal report given to JAYLEN Sanchez on Angela Caicedo  being transferred to  for routine progression of patient care       Report consisted of patient's Situation, Background, Assessment and   Recommendations(SBAR). Information from the following report(s) Nurse Handoff Report was reviewed with the receiving nurse. Elizabeth Fall Assessment:    Presents to emergency department  because of falls (Syncope, seizure, or loss of consciousness): No  Age > 70: No  Altered Mental Status, Intoxication with alcohol or substance confusion (Disorientation, impaired judgment, poor safety awaremess, or inability to follow instructions): No  Impaired Mobility: Ambulates or transfers with assistive devices or assistance; Unable to ambulate or transer.: No  Nursing Judgement: No          Lines:   Peripheral IV Proximal;Right Forearm (Active)       Peripheral IV 10/09/23 Left Antecubital (Active)        Opportunity for questions and clarification was provided.       Patient transported with:  Sharon Will RN  10/10/23 5835

## 2023-10-10 NOTE — PROGRESS NOTES
Initial visit was made, prayer, emotional support and a spiritual presence were provided. She shared that she has an ebony on her phone that assists her with her daily prayers. She said that she has mamadou figurines throughout her house.     Gerson Vásquez, [address], [facility]

## 2023-10-10 NOTE — ASSESSMENT & PLAN NOTE
- Borderline pressures despite 2L boluses in ER  - Continue IVFs to keep MAP>65  - Holding home metoprolol  - Will admit to ICU as patient may need initiation of levophed

## 2023-10-10 NOTE — PROGRESS NOTES
TRANSFER - IN REPORT:    Verbal report received from Jaida Retana RN on Guthrie Or  being received from ED for routine progression of patient care      Report consisted of patient's Situation, Background, Assessment and   Recommendations(SBAR). Information from the following report(s) Nurse Handoff Report was reviewed with the receiving nurse. Opportunity for questions and clarification was provided. Assessment completed upon patient's arrival to unit and care assumed.

## 2023-10-10 NOTE — PROGRESS NOTES
Patient MAP <65. Ranging from 54-61. Notified Dr. Hemanth Roche. Orders to give patient a 1000ml bolus of NS at this time.

## 2023-10-10 NOTE — ED NOTES
TRANSFER - OUT REPORT:    Verbal report given to Swetha Carveras on Sayda Plant  being transferred to [phone] [id number] for routine progression of patient care       Report consisted of patient's Situation, Background, Assessment and   Recommendations(SBAR). Information from the following report(s) Nurse Handoff Report was reviewed with the receiving nurse. Orrum Fall Assessment:    Presents to emergency department  because of falls (Syncope, seizure, or loss of consciousness): No  Age > 70: No  Altered Mental Status, Intoxication with alcohol or substance confusion (Disorientation, impaired judgment, poor safety awaremess, or inability to follow instructions): No  Impaired Mobility: Ambulates or transfers with assistive devices or assistance; Unable to ambulate or transer.: No  Nursing Judgement: No          Lines:   Peripheral IV Proximal;Right Forearm (Active)       Peripheral IV 10/09/23 Left Antecubital (Active)        Opportunity for questions and clarification was provided.       Patient transported with:  Registered Nurse           Margorie Crigler, RN  10/10/23 0429

## 2023-10-10 NOTE — PROGRESS NOTES
TRANSFER - IN REPORT:    Verbal report received from [facility]. on [place]  being received from CCU for routine progression of patient care      Report consisted of patient's Situation, Background, Assessment and   Recommendations(SBAR). Information from the following report(s) Nurse Handoff Report, Index, ED SBAR, Adult Overview, Surgery Report, Intake/Output, MAR, Recent Results, Neuro Assessment, and Event Log was reviewed with the receiving nurse. Opportunity for questions and clarification was provided. Assessment completed upon patient's arrival to unit and care assumed.

## 2023-10-10 NOTE — ASSESSMENT & PLAN NOTE
- WBC is 39.7  - No h/o elevated WBC noted in chart  - Afebrile, only symptom is generalized malaise  - CXR and UA are WNL. Lactic acid is normal.  - Would not expect a viral infection to cause this degree of leukocytosis, but will check viral panel  - ?  Recent steroid use  - Blood cultures x2 obtained in ER  - IVFs  - Given IV antibiotics in ER, will defer additional antibiotics at this time  - Recheck CBC

## 2023-10-10 NOTE — ASSESSMENT & PLAN NOTE
- Will continue home zoloft and trazodone  - However, due to symptoms of general malaise and hypotension, will hold home Klonopin

## 2023-10-11 PROBLEM — E83.42 HYPOMAGNESEMIA: Status: ACTIVE | Noted: 2023-10-11

## 2023-10-11 PROBLEM — E87.6 HYPOKALEMIA: Status: ACTIVE | Noted: 2023-10-11

## 2023-10-11 LAB
ANION GAP SERPL CALC-SCNC: 6 MMOL/L (ref 2–11)
BASOPHILS # BLD: 0 K/UL (ref 0–0.2)
BASOPHILS NFR BLD: 0 % (ref 0–2)
BUN SERPL-MCNC: 3 MG/DL (ref 8–23)
CALCIUM SERPL-MCNC: 7.8 MG/DL (ref 8.3–10.4)
CHLORIDE SERPL-SCNC: 118 MMOL/L (ref 101–110)
CO2 SERPL-SCNC: 23 MMOL/L (ref 21–32)
CREAT SERPL-MCNC: 0.4 MG/DL (ref 0.6–1)
DIFFERENTIAL METHOD BLD: ABNORMAL
EOSINOPHIL # BLD: 0.2 K/UL (ref 0–0.8)
EOSINOPHIL NFR BLD: 2 % (ref 0.5–7.8)
ERYTHROCYTE [DISTWIDTH] IN BLOOD BY AUTOMATED COUNT: 13 % (ref 11.9–14.6)
GLUCOSE SERPL-MCNC: 87 MG/DL (ref 65–100)
HCT VFR BLD AUTO: 31.1 % (ref 35.8–46.3)
HGB BLD-MCNC: 10.2 G/DL (ref 11.7–15.4)
IMM GRANULOCYTES # BLD AUTO: 0 K/UL (ref 0–0.5)
IMM GRANULOCYTES NFR BLD AUTO: 0 % (ref 0–5)
LYMPHOCYTES # BLD: 1.5 K/UL (ref 0.5–4.6)
LYMPHOCYTES NFR BLD: 13 % (ref 13–44)
MAGNESIUM SERPL-MCNC: 1.8 MG/DL (ref 1.8–2.4)
MCH RBC QN AUTO: 34.2 PG (ref 26.1–32.9)
MCHC RBC AUTO-ENTMCNC: 32.8 G/DL (ref 31.4–35)
MCV RBC AUTO: 104.4 FL (ref 82–102)
MM INDURATION, POC: 0 MM (ref 0–5)
MONOCYTES # BLD: 0.7 K/UL (ref 0.1–1.3)
MONOCYTES NFR BLD: 6 % (ref 4–12)
NEUTS SEG # BLD: 8.9 K/UL (ref 1.7–8.2)
NEUTS SEG NFR BLD: 79 % (ref 43–78)
NRBC # BLD: 0 K/UL (ref 0–0.2)
PLATELET # BLD AUTO: 223 K/UL (ref 150–450)
PMV BLD AUTO: 9.6 FL (ref 9.4–12.3)
POTASSIUM SERPL-SCNC: 3.2 MMOL/L (ref 3.5–5.1)
PPD, POC: NEGATIVE
RBC # BLD AUTO: 2.98 M/UL (ref 4.05–5.2)
SODIUM SERPL-SCNC: 147 MMOL/L (ref 133–143)
WBC # BLD AUTO: 11.4 K/UL (ref 4.3–11.1)

## 2023-10-11 PROCEDURE — 97530 THERAPEUTIC ACTIVITIES: CPT

## 2023-10-11 PROCEDURE — 85025 COMPLETE CBC W/AUTO DIFF WBC: CPT

## 2023-10-11 PROCEDURE — 2580000003 HC RX 258: Performed by: FAMILY MEDICINE

## 2023-10-11 PROCEDURE — 76937 US GUIDE VASCULAR ACCESS: CPT

## 2023-10-11 PROCEDURE — 6370000000 HC RX 637 (ALT 250 FOR IP): Performed by: INTERNAL MEDICINE

## 2023-10-11 PROCEDURE — 1100000000 HC RM PRIVATE

## 2023-10-11 PROCEDURE — 6370000000 HC RX 637 (ALT 250 FOR IP): Performed by: NURSE PRACTITIONER

## 2023-10-11 PROCEDURE — 80048 BASIC METABOLIC PNL TOTAL CA: CPT

## 2023-10-11 PROCEDURE — 6370000000 HC RX 637 (ALT 250 FOR IP): Performed by: FAMILY MEDICINE

## 2023-10-11 PROCEDURE — 83735 ASSAY OF MAGNESIUM: CPT

## 2023-10-11 PROCEDURE — 97161 PT EVAL LOW COMPLEX 20 MIN: CPT

## 2023-10-11 PROCEDURE — 36415 COLL VENOUS BLD VENIPUNCTURE: CPT

## 2023-10-11 PROCEDURE — 97535 SELF CARE MNGMENT TRAINING: CPT

## 2023-10-11 PROCEDURE — 97166 OT EVAL MOD COMPLEX 45 MIN: CPT

## 2023-10-11 PROCEDURE — 2580000003 HC RX 258: Performed by: NURSE PRACTITIONER

## 2023-10-11 PROCEDURE — 2580000003 HC RX 258: Performed by: INTERNAL MEDICINE

## 2023-10-11 PROCEDURE — 6360000002 HC RX W HCPCS: Performed by: INTERNAL MEDICINE

## 2023-10-11 RX ORDER — POTASSIUM CHLORIDE 20 MEQ/1
40 TABLET, EXTENDED RELEASE ORAL ONCE
Status: COMPLETED | OUTPATIENT
Start: 2023-10-11 | End: 2023-10-11

## 2023-10-11 RX ORDER — CLONAZEPAM 1 MG/1
1 TABLET ORAL EVERY 8 HOURS PRN
Status: DISCONTINUED | OUTPATIENT
Start: 2023-10-11 | End: 2023-10-13 | Stop reason: HOSPADM

## 2023-10-11 RX ORDER — SODIUM CHLORIDE, SODIUM LACTATE, POTASSIUM CHLORIDE, CALCIUM CHLORIDE 600; 310; 30; 20 MG/100ML; MG/100ML; MG/100ML; MG/100ML
INJECTION, SOLUTION INTRAVENOUS CONTINUOUS
Status: DISCONTINUED | OUTPATIENT
Start: 2023-10-11 | End: 2023-10-13 | Stop reason: HOSPADM

## 2023-10-11 RX ORDER — CLONAZEPAM 1 MG/1
0.5 TABLET ORAL 3 TIMES DAILY
Status: DISCONTINUED | OUTPATIENT
Start: 2023-10-12 | End: 2023-10-13 | Stop reason: HOSPADM

## 2023-10-11 RX ADMIN — GABAPENTIN 600 MG: 300 CAPSULE ORAL at 20:28

## 2023-10-11 RX ADMIN — SERTRALINE 100 MG: 100 TABLET, FILM COATED ORAL at 09:00

## 2023-10-11 RX ADMIN — SODIUM CHLORIDE, POTASSIUM CHLORIDE, SODIUM LACTATE AND CALCIUM CHLORIDE: 600; 310; 30; 20 INJECTION, SOLUTION INTRAVENOUS at 10:20

## 2023-10-11 RX ADMIN — SODIUM CHLORIDE, PRESERVATIVE FREE 10 ML: 5 INJECTION INTRAVENOUS at 20:29

## 2023-10-11 RX ADMIN — GABAPENTIN 600 MG: 300 CAPSULE ORAL at 14:52

## 2023-10-11 RX ADMIN — PANTOPRAZOLE SODIUM 40 MG: 40 TABLET, DELAYED RELEASE ORAL at 05:40

## 2023-10-11 RX ADMIN — GABAPENTIN 600 MG: 300 CAPSULE ORAL at 08:59

## 2023-10-11 RX ADMIN — ENOXAPARIN SODIUM 40 MG: 100 INJECTION SUBCUTANEOUS at 20:28

## 2023-10-11 RX ADMIN — SODIUM CHLORIDE, PRESERVATIVE FREE 10 ML: 5 INJECTION INTRAVENOUS at 09:00

## 2023-10-11 RX ADMIN — TRAZODONE HYDROCHLORIDE 100 MG: 50 TABLET ORAL at 20:28

## 2023-10-11 RX ADMIN — POTASSIUM CHLORIDE 40 MEQ: 1500 TABLET, EXTENDED RELEASE ORAL at 10:25

## 2023-10-11 RX ADMIN — SODIUM CHLORIDE: 9 INJECTION, SOLUTION INTRAVENOUS at 01:55

## 2023-10-11 ASSESSMENT — PAIN SCALES - GENERAL: PAINLEVEL_OUTOF10: 4

## 2023-10-11 ASSESSMENT — PAIN DESCRIPTION - LOCATION: LOCATION: FLANK

## 2023-10-11 ASSESSMENT — PAIN DESCRIPTION - ORIENTATION: ORIENTATION: LEFT

## 2023-10-11 ASSESSMENT — PAIN - FUNCTIONAL ASSESSMENT: PAIN_FUNCTIONAL_ASSESSMENT: ACTIVITIES ARE NOT PREVENTED

## 2023-10-11 ASSESSMENT — PAIN DESCRIPTION - DESCRIPTORS: DESCRIPTORS: SHARP

## 2023-10-11 NOTE — PROGRESS NOTES
I have called pts daughter Orin Bernheim twice today and left messages on her cell, since I was not able to talk to her, I asked on message that she try to be here in the am btw 8-10am for provider rounds, so they can discuss pts work up and get any of her questions answered at bedside.      MACKENZIE Aguilar

## 2023-10-11 NOTE — PROGRESS NOTES
Hospitalist Progress Note   Admit Date:  10/9/2023  5:22 PM   Name:  Chelsea Joshi   Age:  70 y.o. Sex:  female  :  1960   MRN:  [id number]   Room:  222/    Presenting/Chief Complaint: Fatigue     Reason(s) for Admission: Hypomagnesemia [E83.42]  Urinary retention [R33.9]  Hypotension [I95.9]  Hypotension, unspecified hypotension type [I95.9]  Leukocytosis, unspecified type [facility] Course:   Please refer to the admission H&P for details of presentation. In summary, Chelsea Joshi is a 70 y.o. female with medical history significant for HTN, COPD, anxiety who presented to ED with general malaise/weakness which has been worsening over the course of past few days. Upon EMS evaluation, patient was found to be hypotensive, so IVFs were given en route to ER. Upon ER evaluation, patient's BP was still low, so she has been given 2 additional IVF boluses. Labs show WBC of 39.7. All prior WBC counts in chart have been normal.  UA is negative for UTI. CXR showed minimal linear 6 weeks opacities in left lung base likely atelectasis or scarring. After initial evaluation, daughter alerted ER that patient was not answering her phone. Upon recheck, patient was confused. STAT CT head showed no acute intracranial abnormalities but showed generalized brain volume loss and moderate to extensive small vessel ischemic changes. ROUNDS 10/10/23:  Patient is seen and examined at bedside. No acute events reported overnight by nursing staff. Laying in bed. AAOx 3 during my evaluation. BP has been stable and has not required any pressor. Reports having dizziness on standing and with movement. Also reports blurred vision but currently not wearing glasses. Reports symptoms have been going on for a few months. Patient denies fever, chills, chest pains, shortness of breath, n/v, abdominal pain. ROUNDS 10/11/23:  Pt sitting up in chair, conversive, pleasant.  Denies Lymphocytes % 3 (L) 13 - 44 %    Monocytes % 4 4.0 - 12.0 %    Eosinophils % 0 (L) 0.5 - 7.8 %    Basophils % 0 0.0 - 2.0 %    Immature Granulocytes 1 0.0 - 5.0 %    Neutrophils Absolute 36.5 (H) 1.7 - 8.2 K/UL    Lymphocytes Absolute 1.2 0.5 - 4.6 K/UL    Monocytes Absolute 1.6 (H) 0.1 - 1.3 K/UL    Eosinophils Absolute 0.0 0.0 - 0.8 K/UL    Basophils Absolute 0.0 0.0 - 0.2 K/UL    Absolute Immature Granulocyte 0.4 0.0 - 0.5 K/UL    RBC Comment NORMOCYTIC/NORMOCHROMIC      RBC Comment SLIGHT  ANISOCYTOSIS        WBC Comment Result Confirmed By Smear      Platelet Comment DECREASED      Differential Type AUTOMATED     Comprehensive Metabolic Panel    Collection Time: 10/09/23  5:49 PM   Result Value Ref Range    Sodium 138 133 - 143 mmol/L    Potassium 4.0 3.5 - 5.1 mmol/L    Chloride 104 101 - 110 mmol/L    CO2 23 21 - 32 mmol/L    Anion Gap 11 2 - 11 mmol/L    Glucose 91 65 - 100 mg/dL    BUN 6 (L) 8 - 23 MG/DL    Creatinine 0.50 (L) 0.6 - 1.0 MG/DL    Est, Glom Filt Rate >60 >60 ml/min/1.73m2    Calcium 7.6 (L) 8.3 - 10.4 MG/DL    Total Bilirubin 0.7 0.2 - 1.1 MG/DL    ALT 11 (L) 12 - 65 U/L    AST 35 15 - 37 U/L    Alk Phosphatase 74 50 - 136 U/L    Total Protein 5.6 (L) 6.3 - 8.2 g/dL    Albumin 2.4 (L) 3.2 - 4.6 g/dL    Globulin 3.2 2.8 - 4.5 g/dL    Albumin/Globulin Ratio 0.8 0.4 - 1.6     Magnesium    Collection Time: 10/09/23  5:49 PM   Result Value Ref Range    Magnesium 1.4 (L) 1.8 - 2.4 mg/dL   Ethanol    Collection Time: 10/09/23  5:49 PM   Result Value Ref Range    Ethanol Lvl <3 MG/DL   CK    Collection Time: 10/09/23  5:49 PM   Result Value Ref Range    Total  21 - 215 U/L   Lactate, Sepsis    Collection Time: 10/09/23  6:00 PM   Result Value Ref Range    Lactic Acid, Sepsis 0.8 0.4 - 2.0 MMOL/L   Culture, Blood 1    Collection Time: 10/09/23  6:00 PM    Specimen: Blood   Result Value Ref Range    Special Requests NO SPECIAL REQUESTS  RIGHT  Antecubital        Culture NO GROWTH 2 DAYS

## 2023-10-11 NOTE — PLAN OF CARE
Problem: Discharge Planning  Goal: Discharge to home or other facility with appropriate resources  10/10/2023 2234 by Graciela Toro RN  Outcome: Progressing  10/10/2023 1512 by Deloris Manuel RN  Outcome: Progressing  Flowsheets  Taken 10/10/2023 0700 by Mele Patino RN  Discharge to home or other facility with appropriate resources: Identify barriers to discharge with patient and caregiver  Taken 10/10/2023 0359 by Lucy Nelson RN  Discharge to home or other facility with appropriate resources: Identify barriers to discharge with patient and caregiver     Problem: Pain  Goal: Verbalizes/displays adequate comfort level or baseline comfort level  10/10/2023 2234 by Graciela Toro RN  Outcome: Progressing  10/10/2023 1512 by Deloris Manuel RN  Outcome: Progressing     Problem: Skin/Tissue Integrity  Goal: Absence of new skin breakdown  Description: 1. Monitor for areas of redness and/or skin breakdown  2. Assess vascular access sites hourly  3. Every 4-6 hours minimum:  Change oxygen saturation probe site  4. Every 4-6 hours:  If on nasal continuous positive airway pressure, respiratory therapy assess nares and determine need for appliance change or resting period.   10/10/2023 2234 by Graciela Toro RN  Outcome: Progressing  10/10/2023 1512 by Deloris Manuel RN  Outcome: Progressing     Problem: Safety - Adult  Goal: Free from fall injury  10/10/2023 2234 by Graciela Toro RN  Outcome: Progressing  10/10/2023 1512 by Deloris Manuel RN  Outcome: Progressing

## 2023-10-11 NOTE — THERAPY EVALUATION
ACUTE PHYSICAL THERAPY GOALS:   (Developed with and agreed upon by patient and/or caregiver.)    (1.)Ms. Fredi Medrano will move from supine to sit and sit to supine  in bed with MODIFIED INDEPENDENCE within 7 treatment day(s). (2.)Ms. Fredi Medrano will transfer from bed to chair and chair to bed with SUPERVISION using the least restrictive device within 7 treatment day(s). (3.)Ms. Fredi Medrano will ambulate with STAND BY ASSIST for 250 feet with the least restrictive device within 7 treatment day(s). ________________________________________________________________________________________________     PHYSICAL THERAPY Initial Assessment and AM  (Link to Caseload Tracking: PT Visit Days : 1  Acknowledge Orders  Time In/Out  PT Charge Capture  Rehab Caseload Tracker    Selina Oleary is a 70 y.o. female   PRIMARY DIAGNOSIS: Hypotension  Hypomagnesemia [E83.42]  Urinary retention [R33.9]  Hypotension [I95.9]  Hypotension, unspecified hypotension type [I95.9]  Leukocytosis, unspecified type [D72.829]       Reason for Referral: Generalized Muscle Weakness (M62.81)  Difficulty in walking, Not elsewhere classified (R26.2)  Other abnormalities of gait and mobility (R26.89)  Inpatient: Payor: Nadya Ramirez / Plan: Anna Marie Rouse / Product Type: *No Product type* /     ASSESSMENT:     REHAB RECOMMENDATIONS:   Recommendation to date pending progress:  Setting:  Short-term Rehab    Equipment:    To Be Determined     ASSESSMENT:  Ms. Fredi Medrano is a 70year old female who presents above diagnosis. Pt lives alone, independent with ambulation and ADL's, has cane and rolling walker (reports intermittent use at baseline). This date pt performs mobility including bed mobility, transfers and ambulation with use of rolling walker. Pt unsteady at times and appears confused to conversation at times.  Pt presents as functioning below her baseline, with deficits in mobility including transfers, gait, balance, and

## 2023-10-11 NOTE — CARE COORDINATION
CM spoke to patient at bedside today to inform her PT is recommending SNF. Patient stated that she is in agreement to go to SNF. CM gave patient SNF choice list for review. Patient requested that CM call her daughter, Marilynn Sidhu, at [phone], to have her bring patient her glasses. CM called [facility] and she stated she will bring patient's glasses tomorrow morning. CM to follow up.

## 2023-10-11 NOTE — PROGRESS NOTES
Spoke with the psychiatrist.  Informed that pt needs to be restarted on her Klonopin.   Contacted the hospitalist.  Informed that the psychiatrist made several recommendations but she emphasized that it was important to restart her Klonopin

## 2023-10-11 NOTE — CARE COORDINATION
CM reviewed chart and received CM consult for patient to go to SNF placement. PT/OT eval pending at this time. CM to follow up. Refill request received for Victoza.    According to Zita's note on 4/25/22:  --Continue Victoza 1.2 mg daily    Labs  [facility] 12/31/221.    Follow up appointment 10/25/22.    Prescription sent to the pharmacy.

## 2023-10-12 ENCOUNTER — APPOINTMENT (OUTPATIENT)
Dept: MRI IMAGING | Age: 63
End: 2023-10-12
Payer: MEDICARE

## 2023-10-12 LAB
ALBUMIN SERPL-MCNC: 2.6 G/DL (ref 3.2–4.6)
ALBUMIN/GLOB SERPL: 0.7 (ref 0.4–1.6)
ALP SERPL-CCNC: 67 U/L (ref 50–136)
ALT SERPL-CCNC: 15 U/L (ref 12–65)
AMMONIA PLAS-SCNC: <10 UMOL/L (ref 11–32)
ANION GAP SERPL CALC-SCNC: 6 MMOL/L (ref 2–11)
AST SERPL-CCNC: 23 U/L (ref 15–37)
BASOPHILS # BLD: 0.1 K/UL (ref 0–0.2)
BASOPHILS NFR BLD: 1 % (ref 0–2)
BILIRUB SERPL-MCNC: 0.2 MG/DL (ref 0.2–1.1)
BUN SERPL-MCNC: 2 MG/DL (ref 8–23)
CALCIUM SERPL-MCNC: 8.8 MG/DL (ref 8.3–10.4)
CHLORIDE SERPL-SCNC: 115 MMOL/L (ref 101–110)
CO2 SERPL-SCNC: 25 MMOL/L (ref 21–32)
CREAT SERPL-MCNC: 0.6 MG/DL (ref 0.6–1)
DIFFERENTIAL METHOD BLD: ABNORMAL
EOSINOPHIL # BLD: 0.1 K/UL (ref 0–0.8)
EOSINOPHIL NFR BLD: 2 % (ref 0.5–7.8)
ERYTHROCYTE [DISTWIDTH] IN BLOOD BY AUTOMATED COUNT: 12.9 % (ref 11.9–14.6)
EST. AVERAGE GLUCOSE BLD GHB EST-MCNC: 105 MG/DL
GLOBULIN SER CALC-MCNC: 3.5 G/DL (ref 2.8–4.5)
GLUCOSE SERPL-MCNC: 104 MG/DL (ref 65–100)
HBA1C MFR BLD: 5.3 % (ref 4.8–5.6)
HCT VFR BLD AUTO: 34.8 % (ref 35.8–46.3)
HGB BLD-MCNC: 11.5 G/DL (ref 11.7–15.4)
IMM GRANULOCYTES # BLD AUTO: 0 K/UL (ref 0–0.5)
IMM GRANULOCYTES NFR BLD AUTO: 1 % (ref 0–5)
LYMPHOCYTES # BLD: 1.8 K/UL (ref 0.5–4.6)
LYMPHOCYTES NFR BLD: 23 % (ref 13–44)
MAGNESIUM SERPL-MCNC: 1.6 MG/DL (ref 1.8–2.4)
MCH RBC QN AUTO: 34.3 PG (ref 26.1–32.9)
MCHC RBC AUTO-ENTMCNC: 33 G/DL (ref 31.4–35)
MCV RBC AUTO: 103.9 FL (ref 82–102)
MM INDURATION, POC: 0 MM (ref 0–5)
MONOCYTES # BLD: 0.7 K/UL (ref 0.1–1.3)
MONOCYTES NFR BLD: 9 % (ref 4–12)
NEUTS SEG # BLD: 5.1 K/UL (ref 1.7–8.2)
NEUTS SEG NFR BLD: 64 % (ref 43–78)
NRBC # BLD: 0 K/UL (ref 0–0.2)
PLATELET # BLD AUTO: 253 K/UL (ref 150–450)
PMV BLD AUTO: 9.9 FL (ref 9.4–12.3)
POTASSIUM SERPL-SCNC: 3.2 MMOL/L (ref 3.5–5.1)
PPD, POC: NEGATIVE
PROT SERPL-MCNC: 6.1 G/DL (ref 6.3–8.2)
RBC # BLD AUTO: 3.35 M/UL (ref 4.05–5.2)
SODIUM SERPL-SCNC: 146 MMOL/L (ref 133–143)
TSH W FREE THYROID IF ABNORMAL: 1.64 UIU/ML (ref 0.36–3.74)
WBC # BLD AUTO: 7.8 K/UL (ref 4.3–11.1)

## 2023-10-12 PROCEDURE — 6370000000 HC RX 637 (ALT 250 FOR IP): Performed by: INTERNAL MEDICINE

## 2023-10-12 PROCEDURE — 2580000003 HC RX 258: Performed by: NURSE PRACTITIONER

## 2023-10-12 PROCEDURE — 83036 HEMOGLOBIN GLYCOSYLATED A1C: CPT

## 2023-10-12 PROCEDURE — 36415 COLL VENOUS BLD VENIPUNCTURE: CPT

## 2023-10-12 PROCEDURE — 82140 ASSAY OF AMMONIA: CPT

## 2023-10-12 PROCEDURE — 83735 ASSAY OF MAGNESIUM: CPT

## 2023-10-12 PROCEDURE — 80053 COMPREHEN METABOLIC PANEL: CPT

## 2023-10-12 PROCEDURE — 97530 THERAPEUTIC ACTIVITIES: CPT

## 2023-10-12 PROCEDURE — 70551 MRI BRAIN STEM W/O DYE: CPT

## 2023-10-12 PROCEDURE — 2580000003 HC RX 258: Performed by: FAMILY MEDICINE

## 2023-10-12 PROCEDURE — 6370000000 HC RX 637 (ALT 250 FOR IP): Performed by: NURSE PRACTITIONER

## 2023-10-12 PROCEDURE — 85025 COMPLETE CBC W/AUTO DIFF WBC: CPT

## 2023-10-12 PROCEDURE — 97535 SELF CARE MNGMENT TRAINING: CPT

## 2023-10-12 PROCEDURE — 84443 ASSAY THYROID STIM HORMONE: CPT

## 2023-10-12 PROCEDURE — 1100000000 HC RM PRIVATE

## 2023-10-12 PROCEDURE — 6360000002 HC RX W HCPCS: Performed by: INTERNAL MEDICINE

## 2023-10-12 PROCEDURE — 6370000000 HC RX 637 (ALT 250 FOR IP): Performed by: FAMILY MEDICINE

## 2023-10-12 RX ADMIN — SERTRALINE 100 MG: 100 TABLET, FILM COATED ORAL at 08:55

## 2023-10-12 RX ADMIN — SODIUM CHLORIDE, PRESERVATIVE FREE 10 ML: 5 INJECTION INTRAVENOUS at 20:42

## 2023-10-12 RX ADMIN — CLONAZEPAM 0.5 MG: 1 TABLET ORAL at 10:48

## 2023-10-12 RX ADMIN — ENOXAPARIN SODIUM 40 MG: 100 INJECTION SUBCUTANEOUS at 20:40

## 2023-10-12 RX ADMIN — POTASSIUM CHLORIDE 40 MEQ: 1500 TABLET, EXTENDED RELEASE ORAL at 16:13

## 2023-10-12 RX ADMIN — PANTOPRAZOLE SODIUM 40 MG: 40 TABLET, DELAYED RELEASE ORAL at 06:03

## 2023-10-12 RX ADMIN — CLONAZEPAM 0.5 MG: 1 TABLET ORAL at 20:41

## 2023-10-12 RX ADMIN — SODIUM CHLORIDE, PRESERVATIVE FREE 10 ML: 5 INJECTION INTRAVENOUS at 08:55

## 2023-10-12 RX ADMIN — GABAPENTIN 600 MG: 300 CAPSULE ORAL at 20:41

## 2023-10-12 RX ADMIN — CLONAZEPAM 1 MG: 1 TABLET ORAL at 00:04

## 2023-10-12 RX ADMIN — SODIUM CHLORIDE, POTASSIUM CHLORIDE, SODIUM LACTATE AND CALCIUM CHLORIDE: 600; 310; 30; 20 INJECTION, SOLUTION INTRAVENOUS at 19:13

## 2023-10-12 RX ADMIN — CLONAZEPAM 0.5 MG: 1 TABLET ORAL at 14:43

## 2023-10-12 RX ADMIN — GABAPENTIN 600 MG: 300 CAPSULE ORAL at 13:54

## 2023-10-12 RX ADMIN — TRAZODONE HYDROCHLORIDE 100 MG: 50 TABLET ORAL at 20:41

## 2023-10-12 RX ADMIN — GABAPENTIN 600 MG: 300 CAPSULE ORAL at 08:55

## 2023-10-12 RX ADMIN — SODIUM CHLORIDE, POTASSIUM CHLORIDE, SODIUM LACTATE AND CALCIUM CHLORIDE: 600; 310; 30; 20 INJECTION, SOLUTION INTRAVENOUS at 03:21

## 2023-10-12 ASSESSMENT — PAIN SCALES - GENERAL: PAINLEVEL_OUTOF10: 0

## 2023-10-12 NOTE — CARE COORDINATION
JOANNE spoke to Bev at [address] who confirmed patient has been accepted for SNF placement. CM spoke to patient who was made aware of above and agrees to go to [address] for discharge plan. Bev will start insurance pre cert today. CM to follow up.

## 2023-10-12 NOTE — CARE COORDINATION
CM spoke to patient regarding discharge plan. Patient chose the following SNF facilities for rehab: 1. [facility], 2. [facility] and 3. [facility]. CM sent referral accordingly. CM will follow up.

## 2023-10-12 NOTE — PLAN OF CARE
Problem: Discharge Planning  Goal: Discharge to home or other facility with appropriate resources  Outcome: Progressing     Problem: Pain  Goal: Verbalizes/displays adequate comfort level or baseline comfort level  Outcome: Progressing     Problem: Skin/Tissue Integrity  Goal: Absence of new skin breakdown  Description: 1. Monitor for areas of redness and/or skin breakdown  2. Assess vascular access sites hourly  3. Every 4-6 hours minimum:  Change oxygen saturation probe site  4. Every 4-6 hours:  If on nasal continuous positive airway pressure, respiratory therapy assess nares and determine need for appliance change or resting period. Outcome: Progressing     Problem: Safety - Adult  Goal: Free from fall injury  Outcome: Progressing     Problem: Confusion  Goal: Confusion, delirium, dementia, or psychosis is improved or at baseline  Description: INTERVENTIONS:  1. Assess for possible contributors to thought disturbance, including medications, impaired vision or hearing, underlying metabolic abnormalities, dehydration, psychiatric diagnoses, and notify attending LIP  2. [place] high risk fall precautions, as indicated  3. Provide frequent short contacts to provide reality reorientation, refocusing and direction  4. Decrease environmental stimuli, including noise as appropriate  5. Monitor and intervene to maintain adequate nutrition, hydration, elimination, sleep and activity  6. If unable to ensure safety without constant attention obtain sitter and review sitter guidelines with assigned personnel  7.  Initiate Psychosocial CNS and Spiritual Care consult, as indicated  Outcome: Progressing

## 2023-10-12 NOTE — PROGRESS NOTES
potassium chloride (KLOR-CON M) extended release tablet 40 mEq  40 mEq Oral PRN    Or    potassium bicarb-citric acid (EFFER-K) effervescent tablet 40 mEq  40 mEq Oral PRN    Or    potassium chloride 10 mEq/100 mL IVPB (Peripheral Line)  10 mEq IntraVENous PRN    medicated lip ointment (BLISTEX)   Topical PRN    gabapentin (NEURONTIN) capsule 600 mg  600 mg Oral TID    traZODone (DESYREL) tablet 100 mg  100 mg Oral Nightly    enoxaparin (LOVENOX) injection 40 mg  40 mg SubCUTAneous Q24H    [Held by provider] metoprolol tartrate (LOPRESSOR) tablet 50 mg  50 mg Oral BID    sertraline (ZOLOFT) tablet 100 mg  100 mg Oral Daily    traMADol (ULTRAM) tablet 50 mg  50 mg Oral Q6H PRN    pantoprazole (PROTONIX) tablet 40 mg  40 mg Oral QAM AC    sodium chloride flush 0.9 % injection 5-40 mL  5-40 mL IntraVENous 2 times per day    sodium chloride flush 0.9 % injection 5-40 mL  5-40 mL IntraVENous PRN    0.9 % sodium chloride infusion   IntraVENous PRN    ondansetron (ZOFRAN-ODT) disintegrating tablet 4 mg  4 mg Oral Q8H PRN    Or    ondansetron (ZOFRAN) injection 4 mg  4 mg IntraVENous Q6H PRN    polyethylene glycol (GLYCOLAX) packet 17 g  17 g Oral Daily PRN    acetaminophen (TYLENOL) tablet 650 mg  650 mg Oral Q6H PRN    Or    acetaminophen (TYLENOL) suppository 650 mg  650 mg Rectal Q6H PRN       Signed:  Panchito Rodriguez MD    Part of this note may have been written by using a voice dictation software. The note has been proof read but may still contain some grammatical/other typographical errors.

## 2023-10-12 NOTE — PROGRESS NOTES
US Guided PIV access-    Ultrasound was used to find the vein which was compressible and without any ultrasound features of an artery or nerve bundle. Skin was cleaned and disinfected prior to IV puncture. Under real-time ultrasound guidance peripheral access was obtained in the right forearm using 22 G 1.75\" Peripheral IV catheter after 1 attempt(s). Blood return was present and IV flushed without difficulty with no clinical signs of infiltration. IV dressing applied and no immediate complications noted. Patient tolerated the procedure well.

## 2023-10-12 NOTE — PROGRESS NOTES
ACUTE OCCUPATIONAL THERAPY GOALS:   (Developed with and agreed upon by patient and/or caregiver.)  1. Pt will toilet with SBA   2. Pt will complete functional mobility for ADLs with SBA using AD as needed  3. Pt will complete lower body dressing with SBA using AE as needed  4. Pt will complete grooming and hygiene at sink with SBA  5. Pt will demonstrate independence with HEP to promote increased BUE strength and functional use for ADLs  6. Pt will tolerate 23 minutes functional activity with min or fewer rest breaks to promote increased endurance for ADLs  7. Pt will independently demonstrate/ verbalize 2+ energy conservation techniques to promote increased endurance for ADLs        Timeframe: 7 visits    OCCUPATIONAL THERAPY: Daily Note PM   OT Visit Days: 2   Time In/Out  OT Charge Capture  Rehab Caseload Tracker  OT Orders    Mag Fermin is a 70 y.o. female   PRIMARY DIAGNOSIS: Hypotension  Hypomagnesemia [E83.42]  Urinary retention [R33.9]  Hypotension [I95.9]  Hypotension, unspecified hypotension type [I95.9]  Leukocytosis, unspecified type [D72.829]       Inpatient: Payor: Jada Oneal / Plan: Eileen Garces / Product Type: *No Product type* /     ASSESSMENT:     REHAB RECOMMENDATIONS: CURRENT LEVEL OF FUNCTION:  (Most Recently Demonstrated)   Recommendation to date pending progress:  Setting:  Short-term Rehab    Equipment:    To Be Determined Bathing:  Not Tested  Dressing:  Not Tested  Feeding/Grooming:  Contact Guard Assist  Toileting:  Contact Guard Assist  Functional Mobility:  Contact Guard Assist     ASSESSMENT:  Ms. Soren Chacon demonstrated ADLs and functional mobility for ADLs with CGA using RW. Pt w/ impaired safety awareness and required min cues to maintain safety and for use of RW. Pt is progressing towards goals, continue POC. SUBJECTIVE:     Ms. Soren Chacon states, \"I do a lot at home. \"     Social/Functional Lives With: Alone  Home Equipment: [place], Jaycee Socks, rolling  ADL Assistance: Independent  Homemaking Assistance: Independent  Homemaking Responsibilities: Yes  Ambulation Assistance: Independent  Transfer Assistance: Independent  Active : Yes  Occupation: On disability    OBJECTIVE:     Aleks Partida / Elbert Villalba / AIRWAY: IV    RESTRICTIONS/PRECAUTIONS:  Restrictions/Precautions  Restrictions/Precautions: Fall Risk        PAIN: VITALS / O2:   Pre Treatment:    7, back, chronic, repositioned        Post Treatment: same Vitals          Oxygen        MOBILITY: I Mod I S SBA CGA Min Mod Max Total  NT x2 Comments:   Bed Mobility    Rolling [] [] [] [] [] [] [] [] [] [] []    Supine to Sit [] [] [] [] [] [] [] [] [] [] []    Scooting [] [] [] [] [] [] [] [] [] [] []    Sit to Supine [] [] [] [] [] [] [] [] [] [] []    Transfers    Sit to Stand [] [] [] [] [x] [] [] [] [] [] []    Bed to Chair [] [] [] [] [x] [] [] [] [] [] []    Stand to Sit [] [] [] [] [x] [] [] [] [] [] []    Tub/Shower [] [] [] [] [] [] [] [] [] [] []     Toilet [] [] [] [] [x] [] [] [] [] [] []      [] [] [] [] [] [] [] [] [] [] []    I=Independent, Mod I=Modified Independent, S=Supervision/Setup, SBA=Standby Assistance, CGA=Contact Guard Assistance, Min=Minimal Assistance, Mod=Moderate Assistance, Max=Maximal Assistance, Total=Total Assistance, NT=Not Tested    ACTIVITIES OF DAILY LIVING: I Mod I S SBA CGA Min Mod Max Total NT Comments   BASIC ADLs:              Upper Body   Bathing [] [] [] [] [] [] [] [] [] []     Lower Body Bathing [] [] [] [] [] [] [] [] [] []     Toileting [] [] [] [] [x] [] [] [] [] []    Upper Body Dressing [] [] [] [] [] [] [] [] [] []    Lower Body Dressing [] [] [] [] [] [] [] [] [] []    Feeding [] [] [] [] [] [] [] [] [] []    Grooming [] [] [] [] [x] [] [] [] [] []    Personal Device Care [] [] [] [] [] [] [] [] [] []    Functional Mobility [] [] [] [] [x] [] [] [] [] []    I=Independent, Mod I=Modified Independent, S=Supervision/Setup, SBA=Standby Assistance,

## 2023-10-13 VITALS
HEART RATE: 81 BPM | HEIGHT: 64 IN | TEMPERATURE: 98.2 F | OXYGEN SATURATION: 98 % | BODY MASS INDEX: 21.49 KG/M2 | DIASTOLIC BLOOD PRESSURE: 77 MMHG | SYSTOLIC BLOOD PRESSURE: 124 MMHG | RESPIRATION RATE: 14 BRPM | WEIGHT: 125.88 LBS

## 2023-10-13 LAB
ALBUMIN SERPL-MCNC: 2.3 G/DL (ref 3.2–4.6)
ALBUMIN/GLOB SERPL: 0.7 (ref 0.4–1.6)
ALP SERPL-CCNC: 55 U/L (ref 50–136)
ALT SERPL-CCNC: 12 U/L (ref 12–65)
ANION GAP SERPL CALC-SCNC: 2 MMOL/L (ref 2–11)
AST SERPL-CCNC: 18 U/L (ref 15–37)
BILIRUB SERPL-MCNC: <0.1 MG/DL (ref 0.2–1.1)
BUN SERPL-MCNC: 3 MG/DL (ref 8–23)
CALCIUM SERPL-MCNC: 8.8 MG/DL (ref 8.3–10.4)
CHLORIDE SERPL-SCNC: 115 MMOL/L (ref 101–110)
CO2 SERPL-SCNC: 32 MMOL/L (ref 21–32)
CREAT SERPL-MCNC: 0.5 MG/DL (ref 0.6–1)
GLOBULIN SER CALC-MCNC: 3.1 G/DL (ref 2.8–4.5)
GLUCOSE SERPL-MCNC: 100 MG/DL (ref 65–100)
MAGNESIUM SERPL-MCNC: 1.7 MG/DL (ref 1.8–2.4)
POTASSIUM SERPL-SCNC: 3.2 MMOL/L (ref 3.5–5.1)
PROT SERPL-MCNC: 5.4 G/DL (ref 6.3–8.2)
SARS-COV-2 RDRP RESP QL NAA+PROBE: NOT DETECTED
SODIUM SERPL-SCNC: 149 MMOL/L (ref 133–143)
SOURCE: NORMAL

## 2023-10-13 PROCEDURE — 87635 SARS-COV-2 COVID-19 AMP PRB: CPT

## 2023-10-13 PROCEDURE — 97535 SELF CARE MNGMENT TRAINING: CPT

## 2023-10-13 PROCEDURE — 6370000000 HC RX 637 (ALT 250 FOR IP): Performed by: FAMILY MEDICINE

## 2023-10-13 PROCEDURE — 6370000000 HC RX 637 (ALT 250 FOR IP): Performed by: NURSE PRACTITIONER

## 2023-10-13 PROCEDURE — 36415 COLL VENOUS BLD VENIPUNCTURE: CPT

## 2023-10-13 PROCEDURE — 83735 ASSAY OF MAGNESIUM: CPT

## 2023-10-13 PROCEDURE — 97530 THERAPEUTIC ACTIVITIES: CPT

## 2023-10-13 PROCEDURE — 80053 COMPREHEN METABOLIC PANEL: CPT

## 2023-10-13 PROCEDURE — 2580000003 HC RX 258: Performed by: NURSE PRACTITIONER

## 2023-10-13 PROCEDURE — 6370000000 HC RX 637 (ALT 250 FOR IP): Performed by: INTERNAL MEDICINE

## 2023-10-13 RX ORDER — CLONAZEPAM 1 MG/1
0.5 TABLET ORAL 3 TIMES DAILY PRN
Qty: 3 TABLET | Refills: 0 | Status: SHIPPED | OUTPATIENT
Start: 2023-10-13 | End: 2023-10-15

## 2023-10-13 RX ORDER — TRAMADOL HYDROCHLORIDE 50 MG/1
50 TABLET ORAL EVERY 6 HOURS PRN
Qty: 8 TABLET | Refills: 0 | Status: SHIPPED | OUTPATIENT
Start: 2023-10-13 | End: 2023-10-15

## 2023-10-13 RX ORDER — MAGNESIUM SULFATE IN WATER 40 MG/ML
2000 INJECTION, SOLUTION INTRAVENOUS PRN
Status: DISCONTINUED | OUTPATIENT
Start: 2023-10-13 | End: 2023-10-13 | Stop reason: HOSPADM

## 2023-10-13 RX ORDER — GABAPENTIN 300 MG/1
600 CAPSULE ORAL 3 TIMES DAILY
Qty: 84 CAPSULE | Refills: 0 | Status: SHIPPED | OUTPATIENT
Start: 2023-10-13 | End: 2023-10-27

## 2023-10-13 RX ORDER — TRAZODONE HYDROCHLORIDE 100 MG/1
100 TABLET ORAL NIGHTLY
Qty: 14 TABLET | Refills: 0 | Status: SHIPPED | OUTPATIENT
Start: 2023-10-13 | End: 2023-10-27

## 2023-10-13 RX ORDER — POTASSIUM CHLORIDE 20 MEQ/1
40 TABLET, EXTENDED RELEASE ORAL ONCE
Status: DISCONTINUED | OUTPATIENT
Start: 2023-10-13 | End: 2023-10-13 | Stop reason: HOSPADM

## 2023-10-13 RX ORDER — PANTOPRAZOLE SODIUM 40 MG/1
40 TABLET, DELAYED RELEASE ORAL
Qty: 30 TABLET | Refills: 0 | Status: SHIPPED | OUTPATIENT
Start: 2023-10-14

## 2023-10-13 RX ADMIN — CLONAZEPAM 0.5 MG: 1 TABLET ORAL at 08:00

## 2023-10-13 RX ADMIN — GABAPENTIN 600 MG: 300 CAPSULE ORAL at 08:00

## 2023-10-13 RX ADMIN — TRAMADOL HYDROCHLORIDE 50 MG: 50 TABLET ORAL at 08:04

## 2023-10-13 RX ADMIN — CLONAZEPAM 0.5 MG: 1 TABLET ORAL at 14:05

## 2023-10-13 RX ADMIN — GABAPENTIN 600 MG: 300 CAPSULE ORAL at 14:05

## 2023-10-13 RX ADMIN — PANTOPRAZOLE SODIUM 40 MG: 40 TABLET, DELAYED RELEASE ORAL at 05:44

## 2023-10-13 RX ADMIN — SERTRALINE 100 MG: 100 TABLET, FILM COATED ORAL at 08:00

## 2023-10-13 RX ADMIN — POTASSIUM CHLORIDE 40 MEQ: 1500 TABLET, EXTENDED RELEASE ORAL at 05:44

## 2023-10-13 RX ADMIN — SODIUM CHLORIDE, POTASSIUM CHLORIDE, SODIUM LACTATE AND CALCIUM CHLORIDE: 600; 310; 30; 20 INJECTION, SOLUTION INTRAVENOUS at 05:47

## 2023-10-13 ASSESSMENT — PAIN DESCRIPTION - LOCATION: LOCATION: HEAD

## 2023-10-13 ASSESSMENT — PAIN SCALES - GENERAL: PAINLEVEL_OUTOF10: 5

## 2023-10-13 ASSESSMENT — PAIN DESCRIPTION - ORIENTATION: ORIENTATION: MID

## 2023-10-13 ASSESSMENT — PAIN DESCRIPTION - DESCRIPTORS: DESCRIPTORS: ACHING

## 2023-10-13 NOTE — PROGRESS NOTES
ACUTE OCCUPATIONAL THERAPY GOALS:   (Developed with and agreed upon by patient and/or caregiver.)  1. Pt will toilet with SBA   2. Pt will complete functional mobility for ADLs with SBA using AD as needed  3. Pt will complete lower body dressing with SBA using AE as needed  4. Pt will complete grooming and hygiene at sink with SBA  5. Pt will demonstrate independence with HEP to promote increased BUE strength and functional use for ADLs  6. Pt will tolerate 23 minutes functional activity with min or fewer rest breaks to promote increased endurance for ADLs  7. Pt will independently demonstrate/ verbalize 2+ energy conservation techniques to promote increased endurance for ADLs        Timeframe: 7 visits    OCCUPATIONAL THERAPY: Daily Note PM   OT Visit Days: 3   Time In/Out  OT Charge Capture  Rehab Caseload Tracker  OT Orders    Bao Benítez is a 70 y.o. female   PRIMARY DIAGNOSIS: Hypotension  Hypomagnesemia [E83.42]  Urinary retention [R33.9]  Hypotension [I95.9]  Hypotension, unspecified hypotension type [I95.9]  Leukocytosis, unspecified type [D72.829]       Inpatient: Payor: Teresita Macias / Plan: Alexx Lemus / Product Type: *No Product type* /     ASSESSMENT:     REHAB RECOMMENDATIONS: CURRENT LEVEL OF FUNCTION:  (Most Recently Demonstrated)   Recommendation to date pending progress:  Setting:  Short-term Rehab    Equipment:    To Be Determined Bathing:  Not Tested  Dressing:  Not Tested  Feeding/Grooming:  Stand by Assist  Toileting:  Not Tested  Functional Mobility:  Contact Guard Assist     ASSESSMENT:  Ms. Heath Flood demonstrated ADLs and functional mobility for ADLs with CGA using RW. Pt w/ impaired safety awareness and required min cues to maintain safety and for use of RW. Pt is progressing towards goals, continue POC. SUBJECTIVE:     Ms. Heath Flood states, \"I do a lot at home. \"     Social/Functional Lives With: Alone  Home Equipment: Jordan Stair, rolling  ADL

## 2023-10-13 NOTE — PLAN OF CARE
Problem: Discharge Planning  Goal: Discharge to home or other facility with appropriate resources  Outcome: Progressing     Problem: Pain  Goal: Verbalizes/displays adequate comfort level or baseline comfort level  Outcome: Progressing     Problem: Skin/Tissue Integrity  Goal: Absence of new skin breakdown  Description: 1. Monitor for areas of redness and/or skin breakdown  2. Assess vascular access sites hourly  3. Every 4-6 hours minimum:  Change oxygen saturation probe site  4. Every 4-6 hours:  If on nasal continuous positive airway pressure, respiratory therapy assess nares and determine need for appliance change or resting period. Outcome: Progressing     Problem: Safety - Adult  Goal: Free from fall injury  Outcome: Progressing     Problem: Confusion  Goal: Confusion, delirium, dementia, or psychosis is improved or at baseline  Description: INTERVENTIONS:  1. Assess for possible contributors to thought disturbance, including medications, impaired vision or hearing, underlying metabolic abnormalities, dehydration, psychiatric diagnoses, and notify attending LIP  2. [place] high risk fall precautions, as indicated  3. Provide frequent short contacts to provide reality reorientation, refocusing and direction  4. Decrease environmental stimuli, including noise as appropriate  5. Monitor and intervene to maintain adequate nutrition, hydration, elimination, sleep and activity  6. If unable to ensure safety without constant attention obtain sitter and review sitter guidelines with assigned personnel  7.  Initiate Psychosocial CNS and Spiritual Care consult, as indicated  Outcome: Progressing     Problem: ABCDS Injury Assessment  Goal: Absence of physical injury  Outcome: Progressing

## 2023-10-13 NOTE — CARE COORDINATION
Patient has discharge orders in on this day. Patient's discharge plan is to go to [address]  for SNF placement. Patient and patient's daughter Alek Pham are both aware and in agreement with this discharge plan. Interdisciplinary team made aware. Transport arranged. Transport packet complete (PASSAR, 2 H&Ps, 2 facesheet, physician certification, COVID results and PPD results). No CM needs voiced or noted at this time.      ASSESSMENT NOTE    Attending Physician: Helen Floyd MD  Admit Problem: Hypomagnesemia [E83.42]  Urinary retention [R33.9]  Hypotension [I95.9]  Hypotension, unspecified hypotension type [I95.9]  Leukocytosis, unspecified type [D72.829]  Date/Time of Admission: 10/9/2023  5:22 PM  Problem List:  Patient Active Problem List   Diagnosis    Scoliosis    Heme positive stool    Essential hypertension    Encounter for long-term (current) use of high-risk medication    Long term prescription benzodiazepine use    Dupuytren's contracture of left hand    Abnormal CBC    Moderate episode of recurrent major depressive disorder (HCC)    Elevated platelet count    JAYLEN (generalized anxiety disorder)    DDD (degenerative disc disease), lumbar    COPD (chronic obstructive pulmonary disease) (HCC)    Elevated CPK    Elevated liver function tests    Generalized abdominal pain    Psychophysiological insomnia    Rhabdomyolysis    Colitis    Hypotension    Leukocytosis    Thrombocytopenia (HCC)    Confusion    Hypomagnesemia    Hypokalemia       Service Assessment  Patient Orientation Alert and Oriented   Cognition Alert   History Provided By Patient   Primary Caregiver Self   Accompanied By/Relationship none   Support Systems Children, Family Members   Patient's Healthcare Decision Maker is: Legal Next of [facility]   PCP Verified by CM Yes   Last Visit to PCP     Prior Functional Level Independent in ADLs/IADLs   Current Functional Level Assistance with the following:   Can patient return to prior living

## 2023-10-13 NOTE — PROGRESS NOTES
Patient discharged with patient belongs, transported via [address] ambulance. Report given to Rhoderick Koyanagi at [facility]. No needs stated. PIV removed, catheter tip intact. No distress noted.

## 2023-10-13 NOTE — PLAN OF CARE
Problem: Discharge Planning  Goal: Discharge to home or other facility with appropriate resources  Outcome: Completed     Problem: Pain  Goal: Verbalizes/displays adequate comfort level or baseline comfort level  Outcome: Completed     Problem: Skin/Tissue Integrity  Goal: Absence of new skin breakdown  Description: 1. Monitor for areas of redness and/or skin breakdown  2. Assess vascular access sites hourly  3. Every 4-6 hours minimum:  Change oxygen saturation probe site  4. Every 4-6 hours:  If on nasal continuous positive airway pressure, respiratory therapy assess nares and determine need for appliance change or resting period. Outcome: Completed     Problem: Safety - Adult  Goal: Free from fall injury  Outcome: Completed     Problem: Confusion  Goal: Confusion, delirium, dementia, or psychosis is improved or at baseline  Description: INTERVENTIONS:  1. Assess for possible contributors to thought disturbance, including medications, impaired vision or hearing, underlying metabolic abnormalities, dehydration, psychiatric diagnoses, and notify attending LIP  2. [place] high risk fall precautions, as indicated  3. Provide frequent short contacts to provide reality reorientation, refocusing and direction  4. Decrease environmental stimuli, including noise as appropriate  5. Monitor and intervene to maintain adequate nutrition, hydration, elimination, sleep and activity  6. If unable to ensure safety without constant attention obtain sitter and review sitter guidelines with assigned personnel  7.  Initiate Psychosocial CNS and Spiritual Care consult, as indicated  Outcome: Completed     Problem: ABCDS Injury Assessment  Goal: Absence of physical injury  Outcome: Completed

## 2023-10-13 NOTE — CARE COORDINATION
JOANNE spoke to Bev at [facility] who stated patient received insurance approval to go to SNF today. Room: 218D  Report: [phone]    JOANNE to follow up.

## 2023-10-14 LAB
BACTERIA SPEC CULT: NORMAL
SERVICE CMNT-IMP: NORMAL

## 2023-10-15 NOTE — PROGRESS NOTES
Patient called questioning med's that were dc on, stated would take med's the way she wants to.  Encouraged patient to follow doctor orders, and if need anything else call primary care provider    NOTE WRITTEN AT 10/15/23 4200